# Patient Record
Sex: MALE | Race: WHITE | Employment: OTHER | URBAN - METROPOLITAN AREA
[De-identification: names, ages, dates, MRNs, and addresses within clinical notes are randomized per-mention and may not be internally consistent; named-entity substitution may affect disease eponyms.]

---

## 2017-02-07 ENCOUNTER — HOSPITAL ENCOUNTER (OUTPATIENT)
Dept: MRI IMAGING | Facility: HOSPITAL | Age: 37
Discharge: HOME | End: 2017-02-07
Attending: INTERNAL MEDICINE
Payer: MEDICAID

## 2017-02-07 DIAGNOSIS — M51.26 LUMBAR DISC HERNIATION: ICD-10-CM

## 2017-03-01 ENCOUNTER — HOSPITAL ENCOUNTER (OUTPATIENT)
Dept: MRI IMAGING | Facility: HOSPITAL | Age: 37
Discharge: HOME | End: 2017-03-01
Attending: INTERNAL MEDICINE
Payer: MEDICAID

## 2017-03-01 PROCEDURE — 72148 MRI LUMBAR SPINE W/O DYE: CPT

## 2017-03-16 PROBLEM — H91.93 BILATERAL HEARING LOSS, UNSPECIFIED HEARING LOSS TYPE: Status: ACTIVE | Noted: 2017-03-16

## 2017-03-20 PROBLEM — S76.202A INJURY OF ADDUCTOR MUSCLE AND TENDON OF LEFT THIGH, INITIAL ENCOUNTER: Status: ACTIVE | Noted: 2017-03-20

## 2017-04-20 ENCOUNTER — HOSPITAL ENCOUNTER (OUTPATIENT)
Dept: MRI IMAGING | Facility: HOSPITAL | Age: 37
Discharge: HOME | End: 2017-04-20
Attending: ORTHOPAEDIC SURGERY
Payer: MEDICAID

## 2017-04-20 DIAGNOSIS — S76.211A STRAIN OF ADDUCTOR MAGNUS MUSCLE, RIGHT, INITIAL ENCOUNTER: ICD-10-CM

## 2017-04-20 PROCEDURE — 73718 MRI LOWER EXTREMITY W/O DYE: CPT

## 2017-05-08 PROBLEM — S76.201A: Status: ACTIVE | Noted: 2017-05-08

## 2017-05-14 ENCOUNTER — HOSPITAL ENCOUNTER (OUTPATIENT)
Dept: MRI IMAGING | Facility: HOSPITAL | Age: 37
Discharge: HOME | End: 2017-05-14
Attending: ORTHOPAEDIC SURGERY
Payer: MEDICAID

## 2017-05-14 DIAGNOSIS — R10.31 GROIN PAIN, RIGHT: ICD-10-CM

## 2017-05-14 PROCEDURE — 73721 MRI JNT OF LWR EXTRE W/O DYE: CPT | Performed by: ORTHOPAEDIC SURGERY

## 2017-05-28 ENCOUNTER — HOSPITAL ENCOUNTER (EMERGENCY)
Facility: HOSPITAL | Age: 37
Discharge: HOME | End: 2017-05-29
Attending: EMERGENCY MEDICINE
Payer: MEDICAID

## 2017-05-28 DIAGNOSIS — R11.2 NAUSEA AND VOMITING IN ADULT: Primary | ICD-10-CM

## 2017-05-28 DIAGNOSIS — E86.0 DEHYDRATION: ICD-10-CM

## 2017-05-28 DIAGNOSIS — F11.23 OPIATE WITHDRAWAL (HCC): ICD-10-CM

## 2017-05-28 PROCEDURE — 83690 ASSAY OF LIPASE: CPT | Performed by: EMERGENCY MEDICINE

## 2017-05-28 PROCEDURE — 96376 TX/PRO/DX INJ SAME DRUG ADON: CPT

## 2017-05-28 PROCEDURE — 96361 HYDRATE IV INFUSION ADD-ON: CPT

## 2017-05-28 PROCEDURE — 93010 ELECTROCARDIOGRAM REPORT: CPT

## 2017-05-28 PROCEDURE — 85025 COMPLETE CBC W/AUTO DIFF WBC: CPT | Performed by: EMERGENCY MEDICINE

## 2017-05-28 PROCEDURE — 80053 COMPREHEN METABOLIC PANEL: CPT | Performed by: EMERGENCY MEDICINE

## 2017-05-28 PROCEDURE — 96374 THER/PROPH/DIAG INJ IV PUSH: CPT

## 2017-05-28 PROCEDURE — 99284 EMERGENCY DEPT VISIT MOD MDM: CPT

## 2017-05-28 PROCEDURE — 80307 DRUG TEST PRSMV CHEM ANLYZR: CPT | Performed by: EMERGENCY MEDICINE

## 2017-05-28 PROCEDURE — 81001 URINALYSIS AUTO W/SCOPE: CPT | Performed by: EMERGENCY MEDICINE

## 2017-05-28 PROCEDURE — 93005 ELECTROCARDIOGRAM TRACING: CPT

## 2017-05-28 RX ORDER — LORAZEPAM 2 MG/ML
1 INJECTION INTRAMUSCULAR ONCE
Status: COMPLETED | OUTPATIENT
Start: 2017-05-28 | End: 2017-05-28

## 2017-05-28 RX ORDER — LORAZEPAM 2 MG/ML
INJECTION INTRAMUSCULAR
Status: DISCONTINUED
Start: 2017-05-28 | End: 2017-05-29

## 2017-05-29 VITALS
WEIGHT: 185 LBS | HEIGHT: 77 IN | HEART RATE: 73 BPM | RESPIRATION RATE: 16 BRPM | DIASTOLIC BLOOD PRESSURE: 76 MMHG | BODY MASS INDEX: 21.84 KG/M2 | TEMPERATURE: 98 F | OXYGEN SATURATION: 98 % | SYSTOLIC BLOOD PRESSURE: 138 MMHG

## 2017-05-29 RX ORDER — CLONIDINE HYDROCHLORIDE 0.1 MG/1
0.1 TABLET ORAL DAILY
Qty: 10 TABLET | Refills: 0 | Status: SHIPPED | OUTPATIENT
Start: 2017-05-29 | End: 2017-06-07

## 2017-05-29 RX ORDER — LORAZEPAM 1 MG/1
1 TABLET ORAL 2 TIMES DAILY PRN
Qty: 20 TABLET | Refills: 0 | Status: SHIPPED | OUTPATIENT
Start: 2017-05-29 | End: 2017-06-05

## 2017-05-29 RX ORDER — ONDANSETRON 4 MG/1
4 TABLET, ORALLY DISINTEGRATING ORAL EVERY 4 HOURS PRN
Qty: 10 TABLET | Refills: 0 | Status: SHIPPED | OUTPATIENT
Start: 2017-05-29 | End: 2017-06-05
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
## 2017-05-29 NOTE — ED PROVIDER NOTES
Patient Seen in: [facility] Emergency Department    History   Patient presents with:  Eval-P (psychiatric)    Stated Complaint: eval p    HPI    68-year-old male with a history of chronic back pain, history of a right quadriceps muscle injury, asthm daily as needed for Anxiety. CloNIDine HCl 0.1 MG Oral Tab,  Take 1 tablet (0.1 mg total) by mouth daily. ondansetron 4 MG Oral Tablet Dispersible,  Take 1 tablet (4 mg total) by mouth every 4 (four) hours as needed for Nausea.    TraMADol HCl 50 MG Ora clear without evidence of injection or perforation. Neck exam: Supple. There is no lymphadenopathy or carotid bruit. Cardiovascular exam: Regular rate and rhythm. There are no murmurs, rubs, gallops. Lungs: Clear to auscultation bilaterally.   There is ------                     CBC W/ DIFFERENTIAL[[id number]]          Abnormal            Final result                 Please view results for these tests on the individual orders.    RAINBOW DRAW BLUE   RAINBOW DRAW GOLD   RAINBOW DRAW LAVENDER   RAINBOW DRAW Impression:  Nausea and vomiting in adult  (primary encounter diagnosis)  Dehydration  Opiate withdrawal [facility])    Disposition:  Discharge    Follow-up:  [facility]  [address] [facility] [place] [address]  [phone]  Schedule an appoint

## 2017-05-31 PROBLEM — S73.191A LABRAL TEAR OF RIGHT HIP JOINT: Status: ACTIVE | Noted: 2017-05-31

## 2017-05-31 PROBLEM — M16.11 PRIMARY OSTEOARTHRITIS OF RIGHT HIP: Status: ACTIVE | Noted: 2017-05-31

## 2017-05-31 PROBLEM — S76.201S: Status: ACTIVE | Noted: 2017-05-31

## 2017-06-07 PROBLEM — R03.0 ELEVATED BLOOD PRESSURE, SITUATIONAL: Status: ACTIVE | Noted: 2017-06-07

## 2017-07-10 ENCOUNTER — OFFICE VISIT (OUTPATIENT)
Dept: SLEEP CENTER | Facility: HOSPITAL | Age: 37
End: 2017-07-10
Attending: INTERNAL MEDICINE
Payer: MEDICAID

## 2017-07-10 PROCEDURE — 95810 POLYSOM 6/> YRS 4/> PARAM: CPT

## 2017-07-12 NOTE — PROCEDURES
[address] [address]       Accredited by the [facility] ([facility])    PATIENT'S NAME:        Halima Jose  ATTENDING PHYSICIAN:   Crystal Harkins M.D.   REFERRING PHYSICIAN:   Crystal Harkins M.D. yielding an overall apnea-hypopnea index of 2.1. These occurred exclusively in the supine position. In the lateral position, the patient had no apneas or snoring. The patient did not have any significant oxyhemoglobin desaturations.     PERIODIC LIMB MOV 12:17:33  [facility] [id number]  JF/    cc: Blanch Bernheim, MD

## 2017-07-18 ENCOUNTER — APPOINTMENT (OUTPATIENT)
Dept: CT IMAGING | Facility: HOSPITAL | Age: 37
End: 2017-07-18
Attending: EMERGENCY MEDICINE
Payer: MEDICAID

## 2017-07-18 ENCOUNTER — APPOINTMENT (OUTPATIENT)
Dept: GENERAL RADIOLOGY | Facility: HOSPITAL | Age: 37
End: 2017-07-18
Attending: EMERGENCY MEDICINE
Payer: MEDICAID

## 2017-07-18 PROCEDURE — 74177 CT ABD & PELVIS W/CONTRAST: CPT | Performed by: EMERGENCY MEDICINE

## 2017-07-18 PROCEDURE — 72125 CT NECK SPINE W/O DYE: CPT | Performed by: EMERGENCY MEDICINE

## 2017-07-18 PROCEDURE — 71020 XR CHEST PA + LAT CHEST (CPT=71020): CPT | Performed by: EMERGENCY MEDICINE

## 2017-07-18 PROCEDURE — 73000 X-RAY EXAM OF COLLAR BONE: CPT | Performed by: EMERGENCY MEDICINE

## 2017-07-18 PROCEDURE — 70450 CT HEAD/BRAIN W/O DYE: CPT | Performed by: EMERGENCY MEDICINE
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
## 2017-08-19 ENCOUNTER — APPOINTMENT (OUTPATIENT)
Dept: CT IMAGING | Facility: HOSPITAL | Age: 37
End: 2017-08-19
Attending: EMERGENCY MEDICINE
Payer: MEDICAID

## 2017-08-19 ENCOUNTER — APPOINTMENT (OUTPATIENT)
Dept: GENERAL RADIOLOGY | Facility: HOSPITAL | Age: 37
End: 2017-08-19
Attending: EMERGENCY MEDICINE
Payer: MEDICAID

## 2017-08-19 PROCEDURE — 71010 XR CHEST AP PORTABLE  (CPT=71010): CPT | Performed by: EMERGENCY MEDICINE

## 2017-08-19 PROCEDURE — 72125 CT NECK SPINE W/O DYE: CPT | Performed by: EMERGENCY MEDICINE

## 2017-08-19 PROCEDURE — 70450 CT HEAD/BRAIN W/O DYE: CPT | Performed by: EMERGENCY MEDICINE

## 2017-08-20 NOTE — ED NOTES
Pt request meds to assist w/ detox, ER Phys made aware of same and provided Pt w/ 2 medications to help Pt w/ detoxing from alcohol
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
## 2017-08-20 NOTE — ED NOTES
ER PCT safely assisted PT w/ ambulating around \"C\" pod. This was done w/o difficulty or incident.  PCT rpt that Pt walked \"steady\"

## 2017-08-20 NOTE — ED NOTES
ER Phys discussed w/ Pt repercussions of his drinking. Advised Pt that Pt needed to be able to walk safely and independently.

## 2017-08-20 NOTE — ED PROVIDER NOTES
Patient Seen in: [facility] Emergency Department    History   Patient presents with:  Alcohol Intoxication (neurologic)  Eval-P (psychiatric)    Stated Complaint: eval p - alcohol intoxication    HPI    Raymundo Patel is a 39-year-old male with a history of a (two) times daily.    MULTIVITAMINS OR TABS,  1 TABLET DAILY       Family History   Problem Relation Age of Onset   • Breast Cancer Mother    • Heart Disease Father      pacemaker   • Heart Disease Sister      pacemaker    • Heart Disease Paternal A Neutrophil Absolute Prelim 7.25 (*)     Neutrophil Absolute 7.25 (*)     Basophil Absolute 0.11 (*)     All other components within normal limits   BASIC METABOLIC PANEL (8) - Normal   CBC WITH DIFFERENTIAL WITH PLATELET    Narrative:      The following ord diagnosis)    Disposition:  Discharge    Follow-up:  Brigida Huston MD  [address] [phone]    In 3 days        Medications Prescribed:  Current Discharge Medication List

## 2017-09-13 PROBLEM — M51.369 DDD (DEGENERATIVE DISC DISEASE), LUMBAR: Status: ACTIVE | Noted: 2017-09-13

## 2017-09-13 PROBLEM — M51.36 DDD (DEGENERATIVE DISC DISEASE), LUMBAR: Status: ACTIVE | Noted: 2017-09-13

## 2017-09-14 PROBLEM — S42.032D CLOSED DISPLACED FRACTURE OF ACROMIAL END OF LEFT CLAVICLE WITH ROUTINE HEALING, SUBSEQUENT ENCOUNTER: Status: ACTIVE | Noted: 2017-09-14

## 2017-10-17 PROBLEM — M47.816 LUMBAR FACET ARTHROPATHY: Status: ACTIVE | Noted: 2017-10-17

## 2018-05-08 NOTE — ED NOTES
Wife came out to desk and told this RN patient has hx of drug and ETOH abuse, she states he is not honest when he has been using in the past
Writer walked by pt room pt was standing beside cart with c collar on. Pt has unsteady gait. Pt instructed that he needs to stay on the cart until his c spine is cleared and the collar is removed.  Pt and pt visitor VU of information given
normal...

## 2018-12-26 PROBLEM — F11.90 OPIATE USE: Status: ACTIVE | Noted: 2018-12-26

## 2019-02-22 ENCOUNTER — APPOINTMENT (OUTPATIENT)
Dept: GENERAL RADIOLOGY | Facility: HOSPITAL | Age: 39
End: 2019-02-22
Attending: EMERGENCY MEDICINE
Payer: MEDICAID

## 2019-02-22 PROCEDURE — 71045 X-RAY EXAM CHEST 1 VIEW: CPT | Performed by: EMERGENCY MEDICINE

## 2019-02-22 NOTE — ED PROVIDER NOTES
Patient Seen in: [facility] Emergency Department    History   Patient presents with:  Eval-P (psychiatric)    Stated Complaint: chest pain    HPI    65-year-old male presents emergency department claim left lower abdominal pain along with some midst °C) (Temporal)   Resp 11   Ht 195.6 cm (6' 5\")   Wt 88.5 kg   SpO2 94%   BMI 23.12 kg/m²         Physical Exam    Vital signs reviewed  General appearance: Patient is intoxicated with tachycardia  HEENT: Pupils equal react to light extraocular muscles int GOLD   CBC W/ DIFFERENTIAL     EKG    Rate, intervals and axes as noted on EKG Report.   Rate: 174  Rhythm: Atrial Fibrillation  Reading: 2year-old flutter with 2-1 AV block              She was evaluated and definitely appears very dehydrated has been dri

## 2019-02-22 NOTE — ED NOTES
Patient's wife Anisha Armas called and asking for update. She states she is his medical POA. Patient gave his permission for RN to speak with her.

## 2019-02-22 NOTE — BH LEVEL OF CARE ASSESSMENT
Level of Care Assessment Note    General Questions  Why are you here?: Pt states he has been on an 8 day drinking binge  approximately a bottle/Pint a day.   \"i was afraid i might have a heart attack\"   Precipitating Events: Pt. states he came to ER due t access to means to attempt suicide or harm others or property: No  Access to Firearm/Weapon: Yes(Pt. has access to guns and bow/arrow)  Current Location of Firearm/Weapon: all weapons at mothers home and secured  [employer]: secured   Do you ha states pattern can be irregular but the past week it has been every day)  Alcohol Use  Age at first use?: 17  Route: Oral  Average amount used? : Pt. states 750 Mg when he drinks  How long with this pattern of use?: Pt. states 3 years at an irregular patte sure he was ok. He was totally cooperative and responsive to the assessment process. He scored 0 on the CSSR. He epressed no SI or HI. He was alert and coherent. No psychosis observed or presented.   He had an 8 day drinking binge that led to his curre

## 2019-04-18 PROBLEM — G89.29 CHRONIC MIDLINE LOW BACK PAIN WITHOUT SCIATICA: Status: ACTIVE | Noted: 2019-04-18

## 2019-04-18 PROBLEM — M54.50 CHRONIC MIDLINE LOW BACK PAIN WITHOUT SCIATICA: Status: ACTIVE | Noted: 2019-04-18

## 2019-07-18 PROBLEM — Z13.0 SCREENING FOR DISORDER OF BLOOD AND BLOOD-FORMING ORGANS: Status: ACTIVE | Noted: 2019-07-18

## 2019-07-18 PROBLEM — J45.990 EXERCISE-INDUCED ASTHMA (HCC): Status: ACTIVE | Noted: 2019-07-18

## 2019-07-18 PROBLEM — L73.9 FOLLICULITIS: Status: ACTIVE | Noted: 2019-07-18

## 2019-07-18 PROBLEM — Z13.29 SCREENING FOR THYROID DISORDER: Status: ACTIVE | Noted: 2019-07-18

## 2019-07-18 PROBLEM — J45.990 EXERCISE-INDUCED ASTHMA: Status: ACTIVE | Noted: 2019-07-18

## 2019-07-18 PROBLEM — K21.9 GASTROESOPHAGEAL REFLUX DISEASE WITHOUT ESOPHAGITIS: Status: ACTIVE | Noted: 2019-07-18

## 2019-07-18 PROBLEM — Z13.29 ENCOUNTER FOR SCREENING FOR ENDOCRINE DISORDER: Status: ACTIVE | Noted: 2019-07-18

## 2019-07-18 PROBLEM — Z13.228 ENCOUNTER FOR SCREENING FOR METABOLIC DISORDER: Status: ACTIVE | Noted: 2019-07-18

## 2019-07-18 PROBLEM — Z13.220 ENCOUNTER FOR SCREENING FOR LIPID DISORDER: Status: ACTIVE | Noted: 2019-07-18

## 2020-09-09 PROBLEM — R56.9 SEIZURE (HCC): Status: ACTIVE | Noted: 2017-07-12

## 2020-09-09 PROBLEM — F10.239 ALCOHOL WITHDRAWAL SYNDROME WITH COMPLICATION (HCC): Status: ACTIVE | Noted: 2017-07-12

## 2020-09-09 PROBLEM — IMO0001 ELEVATED BLOOD PRESSURE: Status: ACTIVE | Noted: 2017-07-12

## 2020-09-09 PROBLEM — F10.939 ALCOHOL WITHDRAWAL SYNDROME WITH COMPLICATION (HCC): Status: ACTIVE | Noted: 2017-07-12

## 2023-02-02 NOTE — ED NOTES
Patient belongings placed in Smart [employer] bag # [id number] only item is a dark navy blue with green colored pair of shorts. Smart Safe bag placed in bin behind nurses station. medcine Dr Montejo Dr. Montejo Hospitalist Dr. Holm

## 2024-09-29 ENCOUNTER — TELEPHONE (OUTPATIENT)
Dept: CASE MANAGEMENT | Facility: HOSPITAL | Age: 44
End: 2024-09-29

## 2024-09-29 ENCOUNTER — HOSPITAL ENCOUNTER (INPATIENT)
Facility: HOSPITAL | Age: 44
LOS: 3 days | Discharge: HOME | DRG: 682 | End: 2024-10-02
Attending: EMERGENCY MEDICINE | Admitting: HOSPITALIST
Payer: MEDICAID

## 2024-09-29 ENCOUNTER — HOSPITAL ENCOUNTER (INPATIENT)
Facility: HOSPITAL | Age: 44
End: 2024-09-29
Attending: EMERGENCY MEDICINE | Admitting: HOSPITALIST
Payer: MEDICAID

## 2024-09-29 DIAGNOSIS — M54.50 CHRONIC MIDLINE LOW BACK PAIN WITHOUT SCIATICA: ICD-10-CM

## 2024-09-29 DIAGNOSIS — E87.20 METABOLIC ACIDOSIS: ICD-10-CM

## 2024-09-29 DIAGNOSIS — N17.9 ACUTE RENAL FAILURE, UNSPECIFIED ACUTE RENAL FAILURE TYPE (HCC): ICD-10-CM

## 2024-09-29 DIAGNOSIS — E87.1 HYPONATREMIA: Primary | ICD-10-CM

## 2024-09-29 DIAGNOSIS — F10.930 ALCOHOL WITHDRAWAL SYNDROME WITHOUT COMPLICATION (HCC): ICD-10-CM

## 2024-09-29 DIAGNOSIS — G47.50 PARASOMNIA: ICD-10-CM

## 2024-09-29 DIAGNOSIS — R56.9 FIRST TIME SEIZURE (HCC): ICD-10-CM

## 2024-09-29 DIAGNOSIS — G89.29 CHRONIC MIDLINE LOW BACK PAIN WITHOUT SCIATICA: ICD-10-CM

## 2024-09-29 LAB
ALBUMIN SERPL-MCNC: 3.7 G/DL (ref 3.2–4.8)
ALBUMIN/GLOB SERPL: 1.7 {RATIO} (ref 1–2)
ALP LIVER SERPL-CCNC: 75 U/L
ALT SERPL-CCNC: 77 U/L
ANION GAP SERPL CALC-SCNC: 12 MMOL/L (ref 0–18)
ANION GAP SERPL CALC-SCNC: 15 MMOL/L (ref 0–18)
ANION GAP SERPL CALC-SCNC: 31 MMOL/L (ref 0–18)
ANION GAP SERPL CALC-SCNC: 7 MMOL/L (ref 0–18)
AST SERPL-CCNC: 94 U/L (ref ?–34)
BASE EXCESS BLDV CALC-SCNC: -9.5 MMOL/L
BASOPHILS # BLD AUTO: 0.03 X10(3) UL (ref 0–0.2)
BASOPHILS NFR BLD AUTO: 0.3 %
BILIRUB SERPL-MCNC: 2.1 MG/DL (ref 0.3–1.2)
BUN BLD-MCNC: 31 MG/DL (ref 9–23)
BUN BLD-MCNC: 36 MG/DL (ref 9–23)
BUN BLD-MCNC: 39 MG/DL (ref 9–23)
BUN BLD-MCNC: 40 MG/DL (ref 9–23)
C DIFF TOX B STL QL: NEGATIVE
CALCIUM BLD-MCNC: 7 MG/DL (ref 8.7–10.4)
CALCIUM BLD-MCNC: 7.2 MG/DL (ref 8.7–10.4)
CALCIUM BLD-MCNC: 7.2 MG/DL (ref 8.7–10.4)
CALCIUM BLD-MCNC: 7.8 MG/DL (ref 8.7–10.4)
CHLORIDE SERPL-SCNC: 74 MMOL/L (ref 98–112)
CHLORIDE SERPL-SCNC: 79 MMOL/L (ref 98–112)
CHLORIDE SERPL-SCNC: 82 MMOL/L (ref 98–112)
CHLORIDE SERPL-SCNC: 86 MMOL/L (ref 98–112)
CO2 SERPL-SCNC: 12 MMOL/L (ref 21–32)
CO2 SERPL-SCNC: 22 MMOL/L (ref 21–32)
CO2 SERPL-SCNC: 24 MMOL/L (ref 21–32)
CO2 SERPL-SCNC: 27 MMOL/L (ref 21–32)
CREAT BLD-MCNC: 2.09 MG/DL
CREAT BLD-MCNC: 2.69 MG/DL
CREAT BLD-MCNC: 3.13 MG/DL
CREAT BLD-MCNC: 3.29 MG/DL
EGFRCR SERPLBLD CKD-EPI 2021: 23 ML/MIN/1.73M2 (ref 60–?)
EGFRCR SERPLBLD CKD-EPI 2021: 24 ML/MIN/1.73M2 (ref 60–?)
EGFRCR SERPLBLD CKD-EPI 2021: 29 ML/MIN/1.73M2 (ref 60–?)
EGFRCR SERPLBLD CKD-EPI 2021: 39 ML/MIN/1.73M2 (ref 60–?)
EOSINOPHIL # BLD AUTO: 0.01 X10(3) UL (ref 0–0.7)
EOSINOPHIL NFR BLD AUTO: 0.1 %
ERYTHROCYTE [DISTWIDTH] IN BLOOD BY AUTOMATED COUNT: 12.4 %
ERYTHROCYTE [DISTWIDTH] IN BLOOD BY AUTOMATED COUNT: 12.6 %
EST. AVERAGE GLUCOSE BLD GHB EST-MCNC: 203 MG/DL (ref 68–126)
ETHANOL SERPL-MCNC: <3 MG/DL (ref ?–3)
GLOBULIN PLAS-MCNC: 2.2 G/DL (ref 2–3.5)
GLUCOSE BLD-MCNC: 269 MG/DL (ref 70–99)
GLUCOSE BLD-MCNC: 301 MG/DL (ref 70–99)
GLUCOSE BLD-MCNC: 323 MG/DL (ref 70–99)
GLUCOSE BLD-MCNC: 331 MG/DL (ref 70–99)
GLUCOSE BLD-MCNC: 349 MG/DL (ref 70–99)
GLUCOSE BLD-MCNC: 361 MG/DL (ref 70–99)
GLUCOSE BLD-MCNC: 363 MG/DL (ref 70–99)
HBA1C MFR BLD: 8.7 % (ref ?–5.7)
HCO3 BLDV-SCNC: 17.1 MEQ/L (ref 22–26)
HCT VFR BLD AUTO: 36.5 %
HCT VFR BLD AUTO: 39.4 %
HGB BLD-MCNC: 13.5 G/DL
HGB BLD-MCNC: 14.2 G/DL
IMM GRANULOCYTES # BLD AUTO: 0.04 X10(3) UL (ref 0–1)
IMM GRANULOCYTES NFR BLD: 0.4 %
LACTATE BLD-SCNC: 6.2 MMOL/L (ref 0.5–2)
LACTATE SERPL-SCNC: 1.6 MMOL/L (ref 0.5–2)
LACTATE SERPL-SCNC: 2.1 MMOL/L (ref 0.5–2)
LACTATE SERPL-SCNC: 3.9 MMOL/L (ref 0.5–2)
LIPASE SERPL-CCNC: 76 U/L (ref 12–53)
LYMPHOCYTES # BLD AUTO: 1.69 X10(3) UL (ref 1–4)
LYMPHOCYTES NFR BLD AUTO: 16.2 %
MAGNESIUM SERPL-MCNC: 1.8 MG/DL (ref 1.6–2.6)
MCH RBC QN AUTO: 28.7 PG (ref 26–34)
MCH RBC QN AUTO: 29 PG (ref 26–34)
MCHC RBC AUTO-ENTMCNC: 36 G/DL (ref 31–37)
MCHC RBC AUTO-ENTMCNC: 37 G/DL (ref 31–37)
MCV RBC AUTO: 78.5 FL
MCV RBC AUTO: 79.8 FL
MONOCYTES # BLD AUTO: 1.05 X10(3) UL (ref 0.1–1)
MONOCYTES NFR BLD AUTO: 10.1 %
MRSA DNA SPEC QL NAA+PROBE: NEGATIVE
NEUTROPHILS # BLD AUTO: 7.62 X10 (3) UL (ref 1.5–7.7)
NEUTROPHILS # BLD AUTO: 7.62 X10(3) UL (ref 1.5–7.7)
NEUTROPHILS NFR BLD AUTO: 72.9 %
OSMOLALITY SERPL CALC.SUM OF ELEC: 263 MOSM/KG (ref 275–295)
OSMOLALITY SERPL CALC.SUM OF ELEC: 266 MOSM/KG (ref 275–295)
OSMOLALITY SERPL CALC.SUM OF ELEC: 269 MOSM/KG (ref 275–295)
OSMOLALITY SERPL CALC.SUM OF ELEC: 271 MOSM/KG (ref 275–295)
OXYHGB MFR BLDV: 81.7 % (ref 72–78)
PCO2 BLDV: 23 MM HG (ref 38–50)
PH BLDV: 7.38 [PH] (ref 7.33–7.43)
PLATELET # BLD AUTO: 144 10(3)UL (ref 150–450)
PLATELET # BLD AUTO: 187 10(3)UL (ref 150–450)
PLATELETS.RETICULATED NFR BLD AUTO: 8.9 % (ref 0–7)
PO2 BLDV: 51 MM HG (ref 30–50)
POTASSIUM SERPL-SCNC: 3.5 MMOL/L (ref 3.5–5.1)
POTASSIUM SERPL-SCNC: 4 MMOL/L (ref 3.5–5.1)
POTASSIUM SERPL-SCNC: 4 MMOL/L (ref 3.5–5.1)
POTASSIUM SERPL-SCNC: 4.4 MMOL/L (ref 3.5–5.1)
PROT SERPL-MCNC: 5.9 G/DL (ref 5.7–8.2)
RBC # BLD AUTO: 4.65 X10(6)UL
RBC # BLD AUTO: 4.94 X10(6)UL
SARS-COV-2 RNA RESP QL NAA+PROBE: NOT DETECTED
SODIUM SERPL-SCNC: 116 MMOL/L (ref 136–145)
SODIUM SERPL-SCNC: 117 MMOL/L (ref 136–145)
SODIUM SERPL-SCNC: 118 MMOL/L (ref 136–145)
SODIUM SERPL-SCNC: 120 MMOL/L (ref 136–145)
WBC # BLD AUTO: 10.4 X10(3) UL (ref 4–11)
WBC # BLD AUTO: 7.2 X10(3) UL (ref 4–11)

## 2024-09-29 PROCEDURE — 99223 1ST HOSP IP/OBS HIGH 75: CPT | Performed by: HOSPITALIST

## 2024-09-29 PROCEDURE — 99291 CRITICAL CARE FIRST HOUR: CPT | Performed by: INTERNAL MEDICINE

## 2024-09-29 PROCEDURE — HZ2ZZZZ DETOXIFICATION SERVICES FOR SUBSTANCE ABUSE TREATMENT: ICD-10-PCS | Performed by: INTERNAL MEDICINE

## 2024-09-29 RX ORDER — LORAZEPAM 2 MG/ML
0.5 INJECTION INTRAMUSCULAR ONCE
Status: COMPLETED | OUTPATIENT
Start: 2024-09-29 | End: 2024-09-29

## 2024-09-29 RX ORDER — SODIUM CHLORIDE 9 MG/ML
1000 INJECTION, SOLUTION INTRAVENOUS ONCE
Status: COMPLETED | OUTPATIENT
Start: 2024-09-29 | End: 2024-09-29

## 2024-09-29 RX ORDER — LORAZEPAM 2 MG/ML
5 INJECTION INTRAMUSCULAR
Status: DISCONTINUED | OUTPATIENT
Start: 2024-09-29 | End: 2024-09-30

## 2024-09-29 RX ORDER — LORAZEPAM 2 MG/ML
2 INJECTION INTRAMUSCULAR EVERY 2 HOUR PRN
Status: DISCONTINUED | OUTPATIENT
Start: 2024-09-29 | End: 2024-09-30

## 2024-09-29 RX ORDER — LORAZEPAM 1 MG/1
1 TABLET ORAL EVERY 4 HOURS PRN
Status: DISCONTINUED | OUTPATIENT
Start: 2024-09-29 | End: 2024-09-30

## 2024-09-29 RX ORDER — ONDANSETRON 2 MG/ML
4 INJECTION INTRAMUSCULAR; INTRAVENOUS EVERY 6 HOURS PRN
Status: DISCONTINUED | OUTPATIENT
Start: 2024-09-29 | End: 2024-09-29

## 2024-09-29 RX ORDER — FLUTICASONE PROPIONATE AND SALMETEROL 250; 50 UG/1; UG/1
1 POWDER RESPIRATORY (INHALATION) 2 TIMES DAILY
Status: DISCONTINUED | OUTPATIENT
Start: 2024-09-29 | End: 2024-09-29

## 2024-09-29 RX ORDER — PROCHLORPERAZINE EDISYLATE 5 MG/ML
5 INJECTION INTRAMUSCULAR; INTRAVENOUS EVERY 8 HOURS PRN
Status: DISCONTINUED | OUTPATIENT
Start: 2024-09-29 | End: 2024-10-02

## 2024-09-29 RX ORDER — NICOTINE POLACRILEX 4 MG
30 LOZENGE BUCCAL
Status: DISCONTINUED | OUTPATIENT
Start: 2024-09-29 | End: 2024-10-02

## 2024-09-29 RX ORDER — CHLORDIAZEPOXIDE HYDROCHLORIDE 25 MG/1
25 CAPSULE, GELATIN COATED ORAL EVERY 12 HOURS
Status: DISCONTINUED | OUTPATIENT
Start: 2024-10-01 | End: 2024-09-30

## 2024-09-29 RX ORDER — LORAZEPAM 2 MG/ML
3 INJECTION INTRAMUSCULAR
Status: DISCONTINUED | OUTPATIENT
Start: 2024-09-29 | End: 2024-09-30

## 2024-09-29 RX ORDER — ONDANSETRON 2 MG/ML
4 INJECTION INTRAMUSCULAR; INTRAVENOUS EVERY 6 HOURS PRN
Status: DISCONTINUED | OUTPATIENT
Start: 2024-09-29 | End: 2024-10-02

## 2024-09-29 RX ORDER — LORAZEPAM 2 MG/ML
6 INJECTION INTRAMUSCULAR EVERY 10 MIN PRN
Status: DISCONTINUED | OUTPATIENT
Start: 2024-09-29 | End: 2024-09-30

## 2024-09-29 RX ORDER — CHLORDIAZEPOXIDE HYDROCHLORIDE 25 MG/1
25 CAPSULE, GELATIN COATED ORAL EVERY 6 HOURS
Status: COMPLETED | OUTPATIENT
Start: 2024-09-29 | End: 2024-09-30

## 2024-09-29 RX ORDER — THIAMINE HYDROCHLORIDE 100 MG/ML
100 INJECTION, SOLUTION INTRAMUSCULAR; INTRAVENOUS ONCE
Status: COMPLETED | OUTPATIENT
Start: 2024-09-29 | End: 2024-09-29

## 2024-09-29 RX ORDER — SODIUM PHOSPHATE, DIBASIC AND SODIUM PHOSPHATE, MONOBASIC 7; 19 G/230ML; G/230ML
1 ENEMA RECTAL ONCE AS NEEDED
Status: DISCONTINUED | OUTPATIENT
Start: 2024-09-29 | End: 2024-10-02

## 2024-09-29 RX ORDER — LORAZEPAM 2 MG/1
2 TABLET ORAL EVERY 2 HOUR PRN
Status: DISCONTINUED | OUTPATIENT
Start: 2024-09-29 | End: 2024-09-30

## 2024-09-29 RX ORDER — CHLORDIAZEPOXIDE HYDROCHLORIDE 25 MG/1
25 CAPSULE, GELATIN COATED ORAL EVERY 8 HOURS
Status: DISCONTINUED | OUTPATIENT
Start: 2024-09-30 | End: 2024-09-30

## 2024-09-29 RX ORDER — MELATONIN
100 DAILY
Status: DISCONTINUED | OUTPATIENT
Start: 2024-09-30 | End: 2024-10-02

## 2024-09-29 RX ORDER — INSULIN DEGLUDEC 100 U/ML
16 INJECTION, SOLUTION SUBCUTANEOUS DAILY
Status: DISCONTINUED | OUTPATIENT
Start: 2024-09-29 | End: 2024-09-30

## 2024-09-29 RX ORDER — SENNOSIDES 8.6 MG
17.2 TABLET ORAL NIGHTLY PRN
Status: DISCONTINUED | OUTPATIENT
Start: 2024-09-29 | End: 2024-10-02

## 2024-09-29 RX ORDER — BISACODYL 10 MG
10 SUPPOSITORY, RECTAL RECTAL
Status: DISCONTINUED | OUTPATIENT
Start: 2024-09-29 | End: 2024-10-02

## 2024-09-29 RX ORDER — GABAPENTIN 600 MG/1
600 TABLET ORAL NIGHTLY
Status: DISCONTINUED | OUTPATIENT
Start: 2024-09-29 | End: 2024-10-02

## 2024-09-29 RX ORDER — FOLIC ACID 1 MG/1
1 TABLET ORAL DAILY
Status: DISCONTINUED | OUTPATIENT
Start: 2024-09-29 | End: 2024-10-02

## 2024-09-29 RX ORDER — SODIUM CHLORIDE 9 MG/ML
INJECTION, SOLUTION INTRAVENOUS CONTINUOUS
Status: DISCONTINUED | OUTPATIENT
Start: 2024-09-29 | End: 2024-09-30

## 2024-09-29 RX ORDER — ALBUTEROL SULFATE 90 UG/1
2 INHALANT RESPIRATORY (INHALATION) EVERY 6 HOURS PRN
Status: DISCONTINUED | OUTPATIENT
Start: 2024-09-29 | End: 2024-10-02

## 2024-09-29 RX ORDER — GABAPENTIN 300 MG/1
300 CAPSULE ORAL EVERY MORNING
Status: DISCONTINUED | OUTPATIENT
Start: 2024-09-29 | End: 2024-10-02

## 2024-09-29 RX ORDER — ONDANSETRON 2 MG/ML
INJECTION INTRAMUSCULAR; INTRAVENOUS
Status: COMPLETED
Start: 2024-09-29 | End: 2024-09-29

## 2024-09-29 RX ORDER — DEXTROSE MONOHYDRATE 25 G/50ML
50 INJECTION, SOLUTION INTRAVENOUS
Status: DISCONTINUED | OUTPATIENT
Start: 2024-09-29 | End: 2024-10-02

## 2024-09-29 RX ORDER — PROCHLORPERAZINE EDISYLATE 5 MG/ML
5 INJECTION INTRAMUSCULAR; INTRAVENOUS EVERY 8 HOURS PRN
Status: DISCONTINUED | OUTPATIENT
Start: 2024-09-29 | End: 2024-09-29

## 2024-09-29 RX ORDER — LORAZEPAM 2 MG/ML
1 INJECTION INTRAMUSCULAR EVERY 4 HOURS PRN
Status: DISCONTINUED | OUTPATIENT
Start: 2024-09-29 | End: 2024-09-30

## 2024-09-29 RX ORDER — MULTIPLE VITAMINS W/ MINERALS TAB 9MG-400MCG
1 TAB ORAL DAILY
Status: DISCONTINUED | OUTPATIENT
Start: 2024-09-29 | End: 2024-10-02

## 2024-09-29 RX ORDER — LORAZEPAM 2 MG/ML
4 INJECTION INTRAMUSCULAR EVERY 30 MIN PRN
Status: DISCONTINUED | OUTPATIENT
Start: 2024-09-29 | End: 2024-09-30

## 2024-09-29 RX ORDER — MAGNESIUM SULFATE HEPTAHYDRATE 40 MG/ML
2 INJECTION, SOLUTION INTRAVENOUS ONCE
Status: COMPLETED | OUTPATIENT
Start: 2024-09-29 | End: 2024-09-29

## 2024-09-29 RX ORDER — POLYETHYLENE GLYCOL 3350 17 G/17G
17 POWDER, FOR SOLUTION ORAL DAILY PRN
Status: DISCONTINUED | OUTPATIENT
Start: 2024-09-29 | End: 2024-10-02

## 2024-09-29 RX ORDER — HEPARIN SODIUM 5000 [USP'U]/ML
5000 INJECTION, SOLUTION INTRAVENOUS; SUBCUTANEOUS EVERY 8 HOURS SCHEDULED
Status: DISCONTINUED | OUTPATIENT
Start: 2024-09-29 | End: 2024-09-30

## 2024-09-29 RX ORDER — SODIUM CHLORIDE 9 MG/ML
INJECTION, SOLUTION INTRAVENOUS ONCE
Status: COMPLETED | OUTPATIENT
Start: 2024-09-29 | End: 2024-09-29

## 2024-09-29 RX ORDER — CHLORDIAZEPOXIDE HYDROCHLORIDE 25 MG/1
25 CAPSULE, GELATIN COATED ORAL EVERY 24 HOURS
Status: DISCONTINUED | OUTPATIENT
Start: 2024-10-02 | End: 2024-09-30

## 2024-09-29 RX ORDER — SODIUM CHLORIDE 9 MG/ML
INJECTION, SOLUTION INTRAVENOUS CONTINUOUS
Status: ACTIVE | OUTPATIENT
Start: 2024-09-29 | End: 2024-09-29

## 2024-09-29 RX ORDER — NICOTINE POLACRILEX 4 MG
15 LOZENGE BUCCAL
Status: DISCONTINUED | OUTPATIENT
Start: 2024-09-29 | End: 2024-10-02

## 2024-09-29 RX ORDER — MAGNESIUM HYDROXIDE/ALUMINUM HYDROXICE/SIMETHICONE 120; 1200; 1200 MG/30ML; MG/30ML; MG/30ML
30 SUSPENSION ORAL 4 TIMES DAILY PRN
Status: DISCONTINUED | OUTPATIENT
Start: 2024-09-29 | End: 2024-10-02

## 2024-09-29 RX ORDER — CHLORPROMAZINE HYDROCHLORIDE 25 MG/ML
25 INJECTION INTRAMUSCULAR ONCE
Status: COMPLETED | OUTPATIENT
Start: 2024-09-29 | End: 2024-09-29

## 2024-09-29 NOTE — ED QUICK NOTES
Patient was notified the [employer] called and requested he called the 800 number that he has when able to.

## 2024-09-29 NOTE — CONSULTS
[facility]  Report of Consultation    Rambo Freitas Patient Status:  Inpatient    3/19/1980 MRN [id number]   Location [facility] 4SW-A Attending Jo Ann Oliva MD   Hosp Day # 0 PCP None Pcp     Reason for Consultation:  Hyponatremia/NAKUL/acidosis    History of Present Illness:  Rambo Freitas is a a(n) 44 year old man with hx DM,  etOH use/abuse who presented to ED today with c/o severe N/V for the past 24-48 hrs. He had been drinking heavily for several days but stopped yest.  In ED noted to have hyponatremia and NAKUL with  meq/l and creat 3.3 mg/dl.      History:  Past Medical History:    Asthma (HCC)    Back pain    2 bulging disks    Bilateral hearing loss, unspecified hearing loss type    Exercise-induced asthma (HCC)    Mitral valve prolapse    has been checked for heart problems and arrythmia at [facility] (about )    Rib fracture    left side    Sleep walking    Type 1 diabetes mellitus (HCC)     Past Surgical History:   Procedure Laterality Date    Tonsillectomy  age 17     Family History   Problem Relation Age of Onset    Breast Cancer Mother     Heart Disease Father         pacemaker    Heart Disease Sister         pacemaker     Heart Disease Paternal Aunt         pacemaker    Heart Disease Paternal Aunt         pacemaker      reports that he has quit smoking. His smoking use included cigarettes. He has a 10 pack-year smoking history. He has never used smokeless tobacco. He reports that he does not currently use alcohol after a past usage of about 3.0 standard drinks of alcohol per week. He reports that he does not use drugs.    Allergies:  No Known Allergies    Medications:    Current Facility-Administered Medications:     [Held by provider] heparin (Porcine) 5000 UNIT/ML injection 5,000 Units, 5,000 Units, Subcutaneous, Q8H MEGAN    ondansetron (Zofran) 4 MG/2ML injection 4 mg, 4 mg, Intravenous, Q6H PRN    prochlorperazine (Compazine) 10 MG/2ML injection 5 mg, 5 mg,  Intravenous, Q8H PRN    alum-mag hydroxide-simethicone (Maalox) 200-200-20 MG/5ML oral suspension 30 mL, 30 mL, Oral, QID PRN    pantoprazole (Protonix) 40 mg in sodium chloride 0.9% PF 10 mL IV push, 40 mg, Intravenous, Q12H    albuterol (Ventolin HFA) 108 (90 Base) MCG/ACT inhaler 2 puff, 2 puff, Inhalation, Q6H PRN    glucose (Dex4) 15 GM/59ML oral liquid 15 g, 15 g, Oral, Q15 Min PRN **OR** glucose (Glutose) 40% oral gel 15 g, 15 g, Oral, Q15 Min PRN **OR** glucose-vitamin C (Dex-4) chewable tab 4 tablet, 4 tablet, Oral, Q15 Min PRN **OR** dextrose 50% injection 50 mL, 50 mL, Intravenous, Q15 Min PRN **OR** glucose (Dex4) 15 GM/59ML oral liquid 30 g, 30 g, Oral, Q15 Min PRN **OR** glucose (Glutose) 40% oral gel 30 g, 30 g, Oral, Q15 Min PRN **OR** glucose-vitamin C (Dex-4) chewable tab 8 tablet, 8 tablet, Oral, Q15 Min PRN    sodium chloride 0.9% infusion, , Intravenous, Continuous    polyethylene glycol (PEG 3350) (Miralax) 17 g oral packet 17 g, 17 g, Oral, Daily PRN    sennosides (Senokot) tab 17.2 mg, 17.2 mg, Oral, Nightly PRN    bisacodyl (Dulcolax) 10 MG rectal suppository 10 mg, 10 mg, Rectal, Daily PRN    fleet enema (Fleet) rectal enema 133 mL, 1 enema, Rectal, Once PRN    insulin degludec (Tresiba) 100 units/mL flextouch 16 Units, 16 Units, Subcutaneous, Daily    insulin aspart (NovoLOG) 100 Units/mL FlexPen 1-10 Units, 1-10 Units, Subcutaneous, TID AC and HS    gabapentin (Neurontin) cap 300 mg, 300 mg, Oral, QAM    gabapentin (Neurontin) tab 600 mg, 600 mg, Oral, Nightly    LORazepam (Ativan) tab 1 mg, 1 mg, Oral, Q4H PRN **OR** LORazepam (Ativan) 2 mg/mL injection 1 mg, 1 mg, Intravenous, Q4H PRN    LORazepam (Ativan) tab 2 mg, 2 mg, Oral, Q2H PRN **OR** LORazepam (Ativan) 2 mg/mL injection 2 mg, 2 mg, Intravenous, Q2H PRN    LORazepam (Ativan) tab 3 mg, 3 mg, Oral, Q1H PRN **OR** LORazepam (Ativan) 2 mg/mL injection 3 mg, 3 mg, Intravenous, Q1H PRN    LORazepam (Ativan) 2 mg/mL injection 4 mg, 4  mg, Intravenous, Q30 Min PRN    LORazepam (Ativan) 2 mg/mL injection 5 mg, 5 mg, Intravenous, Q15 Min PRN    LORazepam (Ativan) 2 mg/mL injection 6 mg, 6 mg, Intravenous, Q10 Min PRN    chlordiazePOXIDE (Librium) cap 25 mg, 25 mg, Oral, Q6H **FOLLOWED BY** [START ON 2024] chlordiazePOXIDE (Librium) cap 25 mg, 25 mg, Oral, Q8H **FOLLOWED BY** [START ON 10/1/2024] chlordiazePOXIDE (Librium) cap 25 mg, 25 mg, Oral, Q12H **FOLLOWED BY** [START ON 10/2/2024] chlordiazePOXIDE (Librium) cap 25 mg, 25 mg, Oral, Q24H    [START ON 2024] thiamine (Vitamin B1) tab 100 mg, 100 mg, Oral, Daily    multivitamin with minerals (Thera M Plus) tab 1 tablet, 1 tablet, Oral, Daily    folic acid (Folvite) tab 1 mg, 1 mg, Oral, Daily  No current outpatient medications on file.       Review of Systems:  Denies fever/chills  Denies wt loss/gain  Denies HA or visual changes  Denies CP or palpitations  Denies SOB/cough/hemoptysis  Denies abd or flank pain  + N/V  Denies change in urinary habits or gross hematuria  Denies LE edema  Denies skin rashes/myalgias/arthralgias      Physical Exam:   /54   Pulse 96   Temp 98.1 °F (36.7 °C) (Temporal)   Resp 19   Wt 235 lb 7.2 oz (106.8 kg)   SpO2 93%   BMI 27.92 kg/m²   Temp (24hrs), Av °F (36.7 °C), Min:97.9 °F (36.6 °C), Max:98.1 °F (36.7 °C)       Intake/Output Summary (Last 24 hours) at 2024 1323  Last data filed at 2024 1300  Gross per 24 hour   Intake 2137.55 ml   Output 200 ml   Net 1937.55 ml     Last 3 Weights   24 1000 235 lb 7.2 oz (106.8 kg)   24 0918 235 lb 7.2 oz (106.8 kg)   24 1143 230 lb (104.3 kg)   20 1627 202 lb 6.4 oz (91.8 kg)   20 1129 160 lb (72.6 kg)   20 1452 205 lb (93 kg)     General: Alert but a bit confused in no apparent distress.  HEENT: No scleral icterus, dry MM  Neck: Supple, no DELFINO or thyromegaly  Cardiac: Regular rate and rhythm, S1, S2 normal, no murmur or rub  Lungs: Clear without wheezes,  rales, rhonchi.    Abdomen: Soft, mildly diffusely tender. + bowel sounds,   Extremities: Without clubbing, cyanosis or edema.  Neurologic: moving all extremities  Skin: Warm and dry, no rashes      Laboratory Data:  Lab Results   Component Value Date    WBC 10.4 09/29/2024    HGB 14.2 09/29/2024    HCT 39.4 09/29/2024    .0 09/29/2024    CREATSERUM 3.13 09/29/2024    BUN 40 09/29/2024     09/29/2024    K 4.0 09/29/2024    CL 79 09/29/2024    CO2 22.0 09/29/2024     09/29/2024    CA 7.2 09/29/2024    ALB 3.7 09/29/2024    ALKPHO 75 09/29/2024    BILT 2.1 09/29/2024    TP 5.9 09/29/2024    AST 94 09/29/2024    ALT 77 09/29/2024    LIP 76 09/29/2024    MG 1.8 09/29/2024    ETOH <3 09/29/2024    PGLU 349 09/29/2024       BUN (mg/dL)   Date Value   09/29/2024 40 (H)   09/29/2024 31 (H)   09/25/2024 8 (L)     Blood Urea Nitrogen (mg/dL)   Date Value   12/10/2019 13.0   12/02/2019 15.0     Creatinine (mg/dL)   Date Value   09/29/2024 3.13 (H)   09/29/2024 3.29 (H)   09/25/2024 0.87   12/10/2019 0.90   12/02/2019 0.96       No results found for: \"MICROALBCREA\"    Recent Labs   Lab 09/25/24  1239 09/29/24  0547   WBC 6.2 10.4   HGB 15.7 14.2   MCV 81.1 79.8*   .0 187.0       Recent Labs   Lab 09/25/24  1239 09/29/24  0547 09/29/24  1148    117* 116*   K 3.7 4.0 4.0    74* 79*   CO2 23.0 12.0* 22.0   BUN 8* 31* 40*   CREATSERUM 0.87 3.29* 3.13*   CA 9.1 7.8* 7.2*   MG 2.0 1.8  --    * 323* 363*       Recent Labs   Lab 09/25/24  1239 09/29/24  0547   ALT 69* 77*   * 94*   ALB 4.4 3.7       Recent Labs   Lab 09/29/24  1133   PGLU 349*           Impression/Plan:    #1.  NAKUL- in setting of profound volume depletion with severe N/V.  Aggressive IVF and will follow closely    #2.   Hyponatremia- suspect due to hypovolemia.  Can correct reasonably quickly given documented nl na on 9/25.  Follow with aggressive NS for now    #3.  Acidosis- suspect due to NAKUL and well as poss  component starvation ketosis.  No evidence of DKA.  Better with NS    #4.  Etoh use/abuse- CIWA     Thank you for allowing me to participate in the care of your patient. Please do not hesitate to call with any questions or concerns.         Cctime 35 minutes      Michelet Muhammad MD  9/29/2024  1:23 PM

## 2024-09-29 NOTE — CONSULTS
[facility]    Rambo Freitas Patient Status:  Inpatient    3/19/1980 MRN [id number]   Location [facility] 4SW-A Attending Jo Ann Oliva MD   Hosp Day # 0 PCP None Pcp     Date of Admission: 2024  Admission Diagnosis: Hyponatremia [E87.1]  Metabolic acidosis [E87.20]  Alcohol withdrawal syndrome without complication (HCC) [F10.930]  Acute renal failure, unspecified acute renal failure type (HCC) [N17.9]     History of Present Illness: 45 y/o with h/o IDDM, MVP, asthma, presented to ER with intractable N/V; apparently had >50 episodes of emesis.  Admits to dark material being vomited at times but not stephne blood.  - apparently, pt was incarcerated for 4.5 yr and was recently released.  Began drinking ETOH shortly after and has been drinking heavily for the subsequent five days.  Quit drinking 30 hrs ago bc of the vomiting.  Came to ER bc he felt his throat was  closing on him.  Denies f/c/cough/diarrhea/hemoptysis.  In ER, noted to have severe hyponatremia, NAKUL and acidosis.  Admitted for further care.  Currently, pt is tremulous; concerned about having to call his .     Past Medical History:    Back pain    2 bulging disks    Bilateral hearing loss, unspecified hearing loss type    Exercise-induced asthma (HCC)    Mitral valve prolapse    has been checked for heart problems and arrythmia at [facility] (about )    Rib fracture    left side    Sleep walking    Type 1 diabetes mellitus (HCC)      Past Surgical History:   Procedure Laterality Date    Tonsillectomy  age 17         No Known Allergies     Social History:   reports that he has been smoking cigarettes. He has a 10 pack-year smoking history. He has never used smokeless tobacco. He reports current alcohol use. He reports that he does not use drugs.      Family History:  Family History   Problem Relation Age of Onset    Breast Cancer Mother     Heart Disease Father         pacemaker    Heart Disease Sister         pacemaker      Heart Disease Paternal Aunt         pacemaker    Heart Disease Paternal Aunt         pacemaker         Home Medications:  Reviewed. Per EMR     Current Medications:    Current Facility-Administered Medications:     sodium chloride 0.9% infusion, , Intravenous, Continuous     REVIEW OF SYSTEMS:   As listed in HPI, otherwise ten point ROS is negative.     OBJECTIVE:  /75 (BP Location: Right arm)   Pulse 100   Temp 98.1 °F (36.7 °C) (Temporal)   Resp 17   Wt 235 lb 7.2 oz (106.8 kg)   SpO2 98%   BMI 27.92 kg/m²      On room air      Wt Readings from Last 3 Encounters:   24 235 lb 7.2 oz (106.8 kg)   24 230 lb (104.3 kg)   20 202 lb 6.4 oz (91.8 kg)        No intake/output data recorded.  I/O this shift:  In: 1070.6 [I.V.:1070.6]  Out: -      General appearance: alert, appears stated age, cooperative, and slowed mentation  Head: Normocephalic, without obvious abnormality, atraumatic  Neck: no adenopathy, no carotid bruit, no JVD, and supple, symmetrical, trachea midline  Back: symmetric, no curvature. ROM normal. No CVA tenderness.  Lungs: clear to auscultation bilaterally  Chest wall: no tenderness  Heart: regular rate and rhythm, tachy  Abdomen: soft, non-tender; bowel sounds normal; no masses,  no organomegaly  Extremities: extremities normal, atraumatic, no cyanosis or edema  Pulses: 2+ and symmetric  Skin: Skin color, texture, turgor normal. No rashes or lesions     Lab Results   Component Value Date    WBC 10.4 2024    RBC 4.94 2024    HGB 14.2 2024    HCT 39.4 2024    MCV 79.8 2024    MCH 28.7 2024    MCHC 36.0 2024    RDW 12.6 2024    .0 2024     Lab Results   Component Value Date     2024    K 4.0 2024    CL 74 2024    CO2 12.0 2024    VHCO3 17.1 2024    BUN 31 2024    CREATSERUM 3.29 2024     2024    CA 7.8 2024    ALKPHO 75 2024    ALT  77 09/29/2024    AST 94 09/29/2024    BILT 2.1 09/29/2024    ALB 3.7 09/29/2024    TP 5.9 09/29/2024     Lab Results   Component Value Date    INR 0.94 02/22/2019    INR 0.92 12/16/2015          Imaging: none available for review     ASSESSMENT/PLAN:  ETOH withdrawal - currently in window for withdrawal, 24-48hrs last drink  - CIWA per protocol  - MVI/folate/thiamine  - cont with scheduled librium  NAKUL - with severe associated hyponatremia and metabolic acidosis  - due to severe dehydration in assn with N/V and ETOH abuse  - cont with aggressive IVF hydration with saline.  Hold on 3% saline for now  - serial BMPs to prevent over correction of sodium  - renal following closely- d/w Dr. Muhammad  GI - N/V- had some coffee ground emesis as well  - at risk for liberty-Cavazos tear/gastritis  - monitor Hgb serially  - IV PPI bid  - clear liquids  - anti-emetics  - consider GI consult if there is stephen bleeding  IDDM - no clear evidence of DKA  - check for ketones  - IVFs  - ISS, start maint insulin  - hold on insulin gtt for now  Transaminitis- due  to ETOH abuse  - monitor  F/E/N- as above.  Clear only. IVFs  Proph - SCDs  Dispo - Full code  - d/w mother  - ICU care  Critical Care Time: 45 minutes    Maco Colón MD  9/29/2024  10:02 AM

## 2024-09-29 NOTE — ED PROVIDER NOTES
Patient Seen in: [facility] Emergency Department      History     Chief Complaint   Patient presents with    Nausea/Vomiting/Diarrhea    Swallowing Problem     Stated Complaint: vomiting    Subjective:   HPI    Patient is a 44-year-old male with history of asthma, alcohol abuse presenting for evaluation of intractable vomiting for the last 24 hours.  He states his entire esophagus and throat is painful, burning and feels like it was closing because he has been vomiting so much.  He estimates 50-60 episodes over the last 24 hours.  He was drinking heavily for about 4 to 5 days prior to that but has not been able to keep anything down in the last day.    Objective:   Past Medical History:    Back pain    2 bulging disks    Bilateral hearing loss, unspecified hearing loss type    Exercise-induced asthma (HCC)    Mitral valve prolapse    has been checked for heart problems and arrythmia at [place] (about 2013)    Rib fracture    left side    Sleep walking    Type 1 diabetes mellitus (HCC)              Past Surgical History:   Procedure Laterality Date    Tonsillectomy  age 17                Social History     Socioeconomic History    Marital status:    Tobacco Use    Smoking status: Every Day     Current packs/day: 1.00     Average packs/day: 1 pack/day for 10.0 years (10.0 ttl pk-yrs)     Types: Cigarettes    Smokeless tobacco: Never   Vaping Use    Vaping status: Every Day   Substance and Sexual Activity    Alcohol use: Yes     Alcohol/week: 0.0 standard drinks of alcohol     Comment: very rare stopped on christmas 2016    Drug use: No   Social History Narrative    Has six children        Construction work for years              Review of Systems    Positive for stated Chief Complaint: Nausea/Vomiting/Diarrhea and Swallowing Problem    Other systems are as noted in HPI.  Constitutional and vital signs reviewed.      All other systems reviewed and negative except as noted above.    Physical Exam      ED Triage Vitals [09/29/24 0544]   /67   Pulse 95   Resp 18   Temp 97.9 °F (36.6 °C)   Temp src    SpO2 99 %   O2 Device None (Room air)       Current Vitals:   Vital Signs  BP: 152/70  Pulse: 100  Resp: 19  Temp: 97.9 °F (36.6 °C)    Oxygen Therapy  SpO2: 99 %  O2 Device: None (Room air)            Physical Exam  Vitals and nursing note reviewed.   Constitutional:       Appearance: He is well-developed.   HENT:      Head: Normocephalic and atraumatic.   Eyes:      Conjunctiva/sclera: Conjunctivae normal.      Pupils: Pupils are equal, round, and reactive to light.   Cardiovascular:      Rate and Rhythm: Normal rate and regular rhythm.      Heart sounds: Normal heart sounds.   Pulmonary:      Effort: Pulmonary effort is normal.      Breath sounds: Normal breath sounds.   Abdominal:      General: Bowel sounds are normal.      Palpations: Abdomen is soft.      Comments: Mild diffuse tenderness.  Nondistended.  No rebound or guarding.   Musculoskeletal:         General: Normal range of motion.      Cervical back: Normal range of motion and neck supple.   Skin:     General: Skin is warm and dry.   Neurological:      Mental Status: He is alert and oriented to person, place, and time.   Psychiatric:      Comments: Mildly tremulous, appears anxious.               ED Course     Labs Reviewed   CBC WITH DIFFERENTIAL WITH PLATELET - Abnormal; Notable for the following components:       Result Value    MCV 79.8 (*)     Monocyte Absolute 1.05 (*)     All other components within normal limits   COMP METABOLIC PANEL (14) - Abnormal; Notable for the following components:    Glucose 323 (*)     Sodium 117 (*)     Chloride 74 (*)     CO2 12.0 (*)     Anion Gap 31 (*)     BUN 31 (*)     Creatinine 3.29 (*)     Calcium, Total 7.8 (*)     Calculated Osmolality 263 (*)     eGFR-Cr 23 (*)     AST 94 (*)     ALT 77 (*)     Bilirubin, Total 2.1 (*)     All other components within normal limits   LIPASE - Abnormal; Notable for the  following components:    Lipase 76 (*)     All other components within normal limits   VENOUS BLOOD GAS - Abnormal; Notable for the following components:    Venous pCO2 23 (*)     Venous pO2 51 (*)     Venous HCO3 17.1 (*)     Venous O2Hb 81.7 (*)     All other components within normal limits   LACTIC ACID RESPIRATORY - Abnormal; Notable for the following components:    Lactic Acid (Blood Gas) 6.2 (*)     All other components within normal limits   MAGNESIUM - Normal   ETHYL ALCOHOL - Normal   RAINBOW DRAW LAVENDER   RAINBOW DRAW LIGHT GREEN   RAINBOW DRAW BLUE   RAINBOW DRAW GOLD                      [facility]      44-year-old male with history of alcohol abuse presenting for evaluation of intractable vomiting for the last 24 hours.  Has not been able to keep anything down including alcohol.  He states his esophagus and throat are very painful from all the vomiting and he is requesting that we sedate him and put him on a ventilator because it is so painful.  Discussed that that would not be appropriate care at this time.  Is requesting emergent endoscopy to see how it looks and discussed that there is no indication for emergent EGD at this time.  He does look anxious here, will check a CIWA, hydrate him and give him a dose of Ativan.  Labs have been ordered in order to evaluate for anemia, electrolyte abnormality, dehydration.  Admission disposition: 9/29/2024  7:58 AM           Update at 7:05 AM.  Received a call from lab about patient's sodium of 117.  Multiple other lab abnormalities, BUN of 31 with creatinine 3.3, it was normal a few days ago.  Metabolic acidosis with a CO2 of 12.  He is an insulin dependent diabetic and his anion gap is 31 which raises the concern for DKA versus alcoholic ketoacidosis versus more straightforward dehydration.  Will check a venous blood gas to see what his pH is.        Update at 755.  pH is normal at 7.38.  More suspicious for significant dehydration +/- alcoholic ketoacidosis rather  than DKA.  Case discussed with hospitalist, nephrology as well as critical care.  Will hydrated but no insulin drip at this point.  Lactic acid is quite elevated at 6 although he does not have leukocytosis, has not had fevers or illnesses and does not seem to be infected at this point.  Would not start antibiotics.  He will go to the ICU.      A total of 35 minutes of critical care time (exclusive of billable procedures) was administered to manage the patient's critical lab values due to his NAKUL, critical hyponatremia, metabolic acidosis.  This involved direct patient intervention, complex decision making, and/or extensive discussions with the patient, family, and clinical staff.        Past Medical History-insulin-dependent diabetes, alcohol abuse    Differential diagnosis before testing included DKA, other dehydration, electrode abnormality, alcohol withdrawal    Co-morbidities that add to the complexity of management include: None    Testing ordered during this visit included labs, IV fluids      External chart review showed reviewed prior lab results      Discussion of management with hospitalist, nephrology, critical care      Medications Provided: Ativan, Zofran, Protonix            Disposition:    Admission  I have discussed with the patient the results of test, differential diagnosis, and treatment plan. They expressed clear understanding of these instructions and agrees to the plan provided.                                  Medical Decision Making      Disposition and Plan     Clinical Impression:  1. Hyponatremia    2. Acute renal failure, unspecified acute renal failure type (HCC)    3. Metabolic acidosis    4. Alcohol withdrawal syndrome without complication (HCC)         Disposition:  Admit  9/29/2024  7:58 am    Follow-up:  No follow-up provider specified.        Medications Prescribed:  Current Discharge Medication List                            Hospital Problems       Present on Admission  Date  Reviewed: 9/21/2020            ICD-10-CM Noted POA    * (Principal) Hyponatremia E87.1 9/29/2024 Unknown

## 2024-09-29 NOTE — ED QUICK NOTES
Orders for admission, patient is aware of plan and ready to go upstairs. Any questions, please call ED RN Hoda  at extension [phone].     Patient Covid vaccination status: Unvaccinated     COVID Test Ordered in ED: Rapid SARS-CoV-2 by PCR    COVID Suspicion at Admission: N/A    Running Infusions:  NS 1L    Mental Status/LOC at time of transport: A,Ox3    Other pertinent information:   CIWA score: 4   NIH score:  N/A

## 2024-09-29 NOTE — H&P
[facility]  [facility]     Rambo Freitas Patient Status:  Inpatient    3/19/1980 MRN [id number]   Location [facility] 4SW-A Attending Jo Ann Oliva MD   Hosp Day # 0 PCP None Pcp     Chief Complaint: nausea and vomiting    Subjective:    History of Present Illness:     Rambo Freitas is a 44 year old male with medical history of MVP, DM type I and asthma who presents to the ER with complaints of nausea and vomiting.  He reports that for 5 days he was drinking heavy and stopped 1 day ago.  He started having intractable nausea and vomiting for the past day which was associated with burning in his esophagus .  He felt like his throat was closing and swollen.  In the ER he was noted to have NAKUL with alcohol ketoacidosis and dehydration. He also reports his emesis was like coffee ground and he has a history of GERD    History/Other:    Past Medical History:  Past Medical History:    Asthma (HCC)    Back pain    2 bulging disks    Bilateral hearing loss, unspecified hearing loss type    Exercise-induced asthma (HCC)    Mitral valve prolapse    has been checked for heart problems and arrythmia at [place] (about )    Rib fracture    left side    Sleep walking    Type 1 diabetes mellitus (HCC)     Past Surgical History:   Past Surgical History:   Procedure Laterality Date    Tonsillectomy  age 17      Family History:   Family History   Problem Relation Age of Onset    Breast Cancer Mother     Heart Disease Father         pacemaker    Heart Disease Sister         pacemaker     Heart Disease Paternal Aunt         pacemaker    Heart Disease Paternal Aunt         pacemaker     Social History:    reports that he has quit smoking. His smoking use included cigarettes. He has a 10 pack-year smoking history. He has never used smokeless tobacco. He reports that he does not currently use alcohol after a past usage of about 3.0 standard drinks of alcohol per week. He reports that he does not  use drugs.     Allergies: No Known Allergies    Medications:    Current Facility-Administered Medications on File Prior to Encounter   Medication Dose Route Frequency Provider Last Rate Last Admin    [COMPLETED] sodium chloride 0.9 % IV bolus 1,000 mL  1,000 mL Intravenous Once Ren Leach DO   Stopped at 09/25/24 3609     Current Outpatient Medications on File Prior to Encounter   Medication Sig Dispense Refill    gabapentin 300 MG Oral Cap Take 1 capsule (300 mg total) by mouth 2 (two) times daily. (Patient taking differently: Take 1 capsule (300 mg total) by mouth 2 (two) times daily. 300 mg in am, 600 mg in pm) 60 capsule 1    albuterol 108 (90 Base) MCG/ACT Inhalation Aero Soln Inhale 2 puffs into the lungs every 6 (six) hours as needed for Wheezing.      insulin NPH & regular 70/30 (70-30) 100 Units/mL Subcutaneous Suspension Inject 4 Units into the skin 2 (two) times daily before meals.      SYMBICORT 160-4.5 MCG/ACT Inhalation Aerosol INHALE 2 PUFFS BY MOUTH INTO THE LUNGS TWICE DAILY 30.6 g 0    OMEPRAZOLE 40 MG Oral Capsule Delayed Release TAKE 1 CAPSULE(40 MG) BY MOUTH DAILY BEFORE A MEAL 30 capsule 5       Review of Systems:   A comprehensive review of systems was completed.    Pertinent positives and negatives noted in the HPI.    Objective:   Physical Exam:    /75 (BP Location: Right arm)   Pulse 100   Temp 98.1 °F (36.7 °C) (Temporal)   Resp 17   Wt 235 lb 7.2 oz (106.8 kg)   SpO2 98%   BMI 27.92 kg/m²   General: No acute distress, Alert  Respiratory: No rhonchi, no wheezes  Cardiovascular: S1, S2. Regular rate and rhythm  Abdomen: Soft, Non-tender, non-distended, positive bowel sounds  Neuro: No new focal deficits  Extremities: No edema      Results:    Labs:      Labs Last 24 Hours:    Recent Labs   Lab 09/25/24  1239 09/29/24  0547   RBC 5.46 4.94   HGB 15.7 14.2   HCT 44.3 39.4   MCV 81.1 79.8*   MCH 28.8 28.7   MCHC 35.4 36.0   RDW 12.6 12.6   NEPRELIM 4.00 7.62   WBC 6.2 10.4   PLT  225.0 187.0       Recent Labs   Lab 09/25/24  1239 09/29/24  0547   * 323*   BUN 8* 31*   CREATSERUM 0.87 3.29*   EGFRCR 108 23*   CA 9.1 7.8*   ALB 4.4 3.7    117*   K 3.7 4.0    74*   CO2 23.0 12.0*   ALKPHO 88 75   * 94*   ALT 69* 77*   BILT 2.4* 2.1*   TP 7.2 5.9       Lab Results   Component Value Date    INR 0.94 02/22/2019    INR 0.92 12/16/2015       No results for input(s): \"TROP\", \"TROPHS\", \"CK\" in the last 168 hours.    No results for input(s): \"TROP\", \"PBNP\" in the last 168 hours.    No results for input(s): \"PCT\" in the last 168 hours.    Imaging: Imaging data reviewed in Epic.    Assessment & Plan:      #NAKUL  #Hyponatremia  #Metabolic acidosis due to alcohol ketoacidosis and dehydration  -IVF  -renal on consult    #Alcohol abuse  #Elevated LFT  -recent 5 day binge  -monitor for withdrawls  -MTV/ FA. Thiamine    #DM type II  -has not taken insulin  -while npo will order half of basal insulin  -novolog SSI    #coffee ground emesis  -PPI  -NPO  -GI consult  -trend hemoglobin    Plan of care discussed with patient, Rn and ER physician    Jo Ann Oliva MD    Supplementary Documentation:     The 21st Century Cures Act makes medical notes like these available to patients in the interest of transparency. Please be advised this is a medical document. Medical documents are intended to carry relevant information, facts as evident, and the clinical opinion of the practitioner. The medical note is intended as peer to peer communication and may appear blunt or direct. It is written in medical language and may contain abbreviations or verbiage that are unfamiliar.

## 2024-09-29 NOTE — PLAN OF CARE
Received patient from the ED on room air. Patient reports 50-60 episodes of emesis prior to arrival. Patients sodium levels low. Total of 3L of normal saline given. Repeating labs every 4 hours. Continuous NS infusion. Escalating CIWA. Treating with ativan per protocol. Mag replaced per protocol. Patient needs to call [employer] tomorrow for check in. Patient checked in with [employer] today. Needs to check in every 24 hours. Mother was at bedside and updated on plan of care.

## 2024-09-29 NOTE — ED INITIAL ASSESSMENT (HPI)
Pt arrives to ED via EMS for c/o vomiting dark emesis for the past 24 hours. Pt states that his throat feels like it is getting more and more swollen since yesterday and it is hard for him to swallow. Pt able to speak in complete sentences but muffled. Oxygen 100% on RA. Pt has been binge drinking the last 5 days, states his last drink was over 24 hours ago. Pt states hx of seizure in 2017 from withdrawal. Arrives with ankle monitor, just released from retirement 9/13/24.

## 2024-09-29 NOTE — CM/SW NOTE
ELVIE received call from Kofi at the [employer], calling to verify patient location as he is currently a parolee.

## 2024-09-29 NOTE — ED QUICK NOTES
Report called to [facility] ICU RN. Pt ready to be transferred to ICU. Pt remains resting quietly, easy WOB.

## 2024-09-30 ENCOUNTER — ANESTHESIA (OUTPATIENT)
Dept: ENDOSCOPY | Facility: HOSPITAL | Age: 44
End: 2024-09-30
Payer: MEDICAID

## 2024-09-30 ENCOUNTER — ANESTHESIA EVENT (OUTPATIENT)
Dept: ENDOSCOPY | Facility: HOSPITAL | Age: 44
End: 2024-09-30
Payer: MEDICAID

## 2024-09-30 LAB
ALBUMIN SERPL-MCNC: 3.3 G/DL (ref 3.2–4.8)
ALBUMIN/GLOB SERPL: 1.7 {RATIO} (ref 1–2)
ALP LIVER SERPL-CCNC: 64 U/L
ALT SERPL-CCNC: 72 U/L
ANION GAP SERPL CALC-SCNC: 10 MMOL/L (ref 0–18)
ANION GAP SERPL CALC-SCNC: 7 MMOL/L (ref 0–18)
ANION GAP SERPL CALC-SCNC: 7 MMOL/L (ref 0–18)
ANION GAP SERPL CALC-SCNC: 8 MMOL/L (ref 0–18)
ANION GAP SERPL CALC-SCNC: 9 MMOL/L (ref 0–18)
ANION GAP SERPL CALC-SCNC: 9 MMOL/L (ref 0–18)
AST SERPL-CCNC: 102 U/L (ref ?–34)
BASOPHILS # BLD AUTO: 0.01 X10(3) UL (ref 0–0.2)
BASOPHILS NFR BLD AUTO: 0.2 %
BILIRUB SERPL-MCNC: 1.9 MG/DL (ref 0.3–1.2)
BUN BLD-MCNC: 15 MG/DL (ref 9–23)
BUN BLD-MCNC: 21 MG/DL (ref 9–23)
BUN BLD-MCNC: 27 MG/DL (ref 9–23)
BUN BLD-MCNC: 31 MG/DL (ref 9–23)
BUN BLD-MCNC: 31 MG/DL (ref 9–23)
BUN BLD-MCNC: 35 MG/DL (ref 9–23)
CALCIUM BLD-MCNC: 7.4 MG/DL (ref 8.7–10.4)
CALCIUM BLD-MCNC: 7.5 MG/DL (ref 8.7–10.4)
CALCIUM BLD-MCNC: 7.5 MG/DL (ref 8.7–10.4)
CALCIUM BLD-MCNC: 7.8 MG/DL (ref 8.7–10.4)
CALCIUM BLD-MCNC: 7.8 MG/DL (ref 8.7–10.4)
CALCIUM BLD-MCNC: 7.9 MG/DL (ref 8.7–10.4)
CHLORIDE SERPL-SCNC: 90 MMOL/L (ref 98–112)
CHLORIDE SERPL-SCNC: 93 MMOL/L (ref 98–112)
CHLORIDE SERPL-SCNC: 97 MMOL/L (ref 98–112)
CHLORIDE SERPL-SCNC: 97 MMOL/L (ref 98–112)
CO2 SERPL-SCNC: 24 MMOL/L (ref 21–32)
CO2 SERPL-SCNC: 26 MMOL/L (ref 21–32)
CO2 SERPL-SCNC: 26 MMOL/L (ref 21–32)
CO2 SERPL-SCNC: 27 MMOL/L (ref 21–32)
CO2 SERPL-SCNC: 27 MMOL/L (ref 21–32)
CO2 SERPL-SCNC: 28 MMOL/L (ref 21–32)
CREAT BLD-MCNC: 0.92 MG/DL
CREAT BLD-MCNC: 1.11 MG/DL
CREAT BLD-MCNC: 1.27 MG/DL
CREAT BLD-MCNC: 1.47 MG/DL
CREAT BLD-MCNC: 1.47 MG/DL
CREAT BLD-MCNC: 1.6 MG/DL
EGFRCR SERPLBLD CKD-EPI 2021: 105 ML/MIN/1.73M2 (ref 60–?)
EGFRCR SERPLBLD CKD-EPI 2021: 54 ML/MIN/1.73M2 (ref 60–?)
EGFRCR SERPLBLD CKD-EPI 2021: 60 ML/MIN/1.73M2 (ref 60–?)
EGFRCR SERPLBLD CKD-EPI 2021: 60 ML/MIN/1.73M2 (ref 60–?)
EGFRCR SERPLBLD CKD-EPI 2021: 71 ML/MIN/1.73M2 (ref 60–?)
EGFRCR SERPLBLD CKD-EPI 2021: 83 ML/MIN/1.73M2 (ref 60–?)
EOSINOPHIL # BLD AUTO: 0.02 X10(3) UL (ref 0–0.7)
EOSINOPHIL NFR BLD AUTO: 0.3 %
ERYTHROCYTE [DISTWIDTH] IN BLOOD BY AUTOMATED COUNT: 12.3 %
ERYTHROCYTE [DISTWIDTH] IN BLOOD BY AUTOMATED COUNT: 12.3 %
ERYTHROCYTE [DISTWIDTH] IN BLOOD BY AUTOMATED COUNT: 12.6 %
ERYTHROCYTE [DISTWIDTH] IN BLOOD BY AUTOMATED COUNT: 12.7 %
GLOBULIN PLAS-MCNC: 1.9 G/DL (ref 2–3.5)
GLUCOSE BLD-MCNC: 189 MG/DL (ref 70–99)
GLUCOSE BLD-MCNC: 201 MG/DL (ref 70–99)
GLUCOSE BLD-MCNC: 215 MG/DL (ref 70–99)
GLUCOSE BLD-MCNC: 224 MG/DL (ref 70–99)
GLUCOSE BLD-MCNC: 254 MG/DL (ref 70–99)
GLUCOSE BLD-MCNC: 268 MG/DL (ref 70–99)
GLUCOSE BLD-MCNC: 271 MG/DL (ref 70–99)
GLUCOSE BLD-MCNC: 286 MG/DL (ref 70–99)
GLUCOSE BLD-MCNC: 286 MG/DL (ref 70–99)
GLUCOSE BLD-MCNC: 330 MG/DL (ref 70–99)
HCT VFR BLD AUTO: 33.7 %
HCT VFR BLD AUTO: 34.3 %
HCT VFR BLD AUTO: 35.4 %
HCT VFR BLD AUTO: 36.5 %
HGB BLD-MCNC: 12.6 G/DL
HGB BLD-MCNC: 12.8 G/DL
HGB BLD-MCNC: 13.1 G/DL
HGB BLD-MCNC: 13.4 G/DL
IMM GRANULOCYTES # BLD AUTO: 0.03 X10(3) UL (ref 0–1)
IMM GRANULOCYTES NFR BLD: 0.5 %
LYMPHOCYTES # BLD AUTO: 0.81 X10(3) UL (ref 1–4)
LYMPHOCYTES NFR BLD AUTO: 12.5 %
MAGNESIUM SERPL-MCNC: 3.2 MG/DL (ref 1.6–2.6)
MCH RBC QN AUTO: 28.5 PG (ref 26–34)
MCH RBC QN AUTO: 28.9 PG (ref 26–34)
MCH RBC QN AUTO: 29 PG (ref 26–34)
MCH RBC QN AUTO: 29.2 PG (ref 26–34)
MCHC RBC AUTO-ENTMCNC: 36.7 G/DL (ref 31–37)
MCHC RBC AUTO-ENTMCNC: 37 G/DL (ref 31–37)
MCHC RBC AUTO-ENTMCNC: 37.3 G/DL (ref 31–37)
MCHC RBC AUTO-ENTMCNC: 37.4 G/DL (ref 31–37)
MCV RBC AUTO: 76.2 FL
MCV RBC AUTO: 77.8 FL
MCV RBC AUTO: 78.7 FL
MCV RBC AUTO: 78.8 FL
MONOCYTES # BLD AUTO: 0.67 X10(3) UL (ref 0.1–1)
MONOCYTES NFR BLD AUTO: 10.3 %
NEUTROPHILS # BLD AUTO: 4.94 X10 (3) UL (ref 1.5–7.7)
NEUTROPHILS # BLD AUTO: 4.94 X10(3) UL (ref 1.5–7.7)
NEUTROPHILS NFR BLD AUTO: 76.2 %
OSMOLALITY SERPL CALC.SUM OF ELEC: 275 MOSM/KG (ref 275–295)
OSMOLALITY SERPL CALC.SUM OF ELEC: 282 MOSM/KG (ref 275–295)
OSMOLALITY SERPL CALC.SUM OF ELEC: 282 MOSM/KG (ref 275–295)
OSMOLALITY SERPL CALC.SUM OF ELEC: 283 MOSM/KG (ref 275–295)
PLATELET # BLD AUTO: 119 10(3)UL (ref 150–450)
PLATELET # BLD AUTO: 124 10(3)UL (ref 150–450)
PLATELET # BLD AUTO: 126 10(3)UL (ref 150–450)
PLATELET # BLD AUTO: 129 10(3)UL (ref 150–450)
PLATELETS.RETICULATED NFR BLD AUTO: 9.8 % (ref 0–7)
POTASSIUM SERPL-SCNC: 3.3 MMOL/L (ref 3.5–5.1)
POTASSIUM SERPL-SCNC: 3.3 MMOL/L (ref 3.5–5.1)
POTASSIUM SERPL-SCNC: 3.5 MMOL/L (ref 3.5–5.1)
POTASSIUM SERPL-SCNC: 3.5 MMOL/L (ref 3.5–5.1)
POTASSIUM SERPL-SCNC: 3.7 MMOL/L (ref 3.5–5.1)
POTASSIUM SERPL-SCNC: 3.7 MMOL/L (ref 3.5–5.1)
PROT SERPL-MCNC: 5.2 G/DL (ref 5.7–8.2)
RBC # BLD AUTO: 4.41 X10(6)UL
RBC # BLD AUTO: 4.42 X10(6)UL
RBC # BLD AUTO: 4.49 X10(6)UL
RBC # BLD AUTO: 4.64 X10(6)UL
SODIUM SERPL-SCNC: 124 MMOL/L (ref 136–145)
SODIUM SERPL-SCNC: 127 MMOL/L (ref 136–145)
SODIUM SERPL-SCNC: 128 MMOL/L (ref 136–145)
SODIUM SERPL-SCNC: 128 MMOL/L (ref 136–145)
SODIUM SERPL-SCNC: 132 MMOL/L (ref 136–145)
SODIUM SERPL-SCNC: 132 MMOL/L (ref 136–145)
WBC # BLD AUTO: 5.1 X10(3) UL (ref 4–11)
WBC # BLD AUTO: 6.5 X10(3) UL (ref 4–11)
WBC # BLD AUTO: 6.7 X10(3) UL (ref 4–11)
WBC # BLD AUTO: 6.9 X10(3) UL (ref 4–11)

## 2024-09-30 PROCEDURE — 99291 CRITICAL CARE FIRST HOUR: CPT | Performed by: INTERNAL MEDICINE

## 2024-09-30 PROCEDURE — 0DB78ZX EXCISION OF STOMACH, PYLORUS, VIA NATURAL OR ARTIFICIAL OPENING ENDOSCOPIC, DIAGNOSTIC: ICD-10-PCS | Performed by: INTERNAL MEDICINE

## 2024-09-30 PROCEDURE — 99232 SBSQ HOSP IP/OBS MODERATE 35: CPT | Performed by: HOSPITALIST

## 2024-09-30 PROCEDURE — 0DB98ZX EXCISION OF DUODENUM, VIA NATURAL OR ARTIFICIAL OPENING ENDOSCOPIC, DIAGNOSTIC: ICD-10-PCS | Performed by: INTERNAL MEDICINE

## 2024-09-30 PROCEDURE — 99291 CRITICAL CARE FIRST HOUR: CPT | Performed by: EMERGENCY MEDICINE

## 2024-09-30 RX ORDER — INSULIN DEGLUDEC 100 U/ML
20 INJECTION, SOLUTION SUBCUTANEOUS DAILY
Status: DISCONTINUED | OUTPATIENT
Start: 2024-10-01 | End: 2024-09-30

## 2024-09-30 RX ORDER — LIDOCAINE HYDROCHLORIDE 10 MG/ML
INJECTION, SOLUTION EPIDURAL; INFILTRATION; INTRACAUDAL; PERINEURAL AS NEEDED
Status: DISCONTINUED | OUTPATIENT
Start: 2024-09-30 | End: 2024-09-30 | Stop reason: SURG

## 2024-09-30 RX ORDER — INSULIN DEGLUDEC 100 U/ML
25 INJECTION, SOLUTION SUBCUTANEOUS DAILY
Status: DISCONTINUED | OUTPATIENT
Start: 2024-10-01 | End: 2024-10-02

## 2024-09-30 RX ORDER — PANTOPRAZOLE SODIUM 40 MG/1
40 TABLET, DELAYED RELEASE ORAL
Status: DISCONTINUED | OUTPATIENT
Start: 2024-10-01 | End: 2024-10-02

## 2024-09-30 RX ORDER — NALOXONE HYDROCHLORIDE 0.4 MG/ML
80 INJECTION, SOLUTION INTRAMUSCULAR; INTRAVENOUS; SUBCUTANEOUS AS NEEDED
Status: DISCONTINUED | OUTPATIENT
Start: 2024-09-30 | End: 2024-09-30 | Stop reason: HOSPADM

## 2024-09-30 RX ORDER — SODIUM CHLORIDE, SODIUM LACTATE, POTASSIUM CHLORIDE, CALCIUM CHLORIDE 600; 310; 30; 20 MG/100ML; MG/100ML; MG/100ML; MG/100ML
INJECTION, SOLUTION INTRAVENOUS CONTINUOUS
Status: CANCELLED | OUTPATIENT
Start: 2024-09-30

## 2024-09-30 RX ORDER — SODIUM CHLORIDE, SODIUM LACTATE, POTASSIUM CHLORIDE, CALCIUM CHLORIDE 600; 310; 30; 20 MG/100ML; MG/100ML; MG/100ML; MG/100ML
INJECTION, SOLUTION INTRAVENOUS CONTINUOUS PRN
Status: DISCONTINUED | OUTPATIENT
Start: 2024-09-30 | End: 2024-09-30 | Stop reason: SURG

## 2024-09-30 RX ORDER — ENOXAPARIN SODIUM 100 MG/ML
40 INJECTION SUBCUTANEOUS DAILY
Status: DISCONTINUED | OUTPATIENT
Start: 2024-10-01 | End: 2024-10-02

## 2024-09-30 RX ORDER — POTASSIUM CHLORIDE 14.9 MG/ML
20 INJECTION INTRAVENOUS ONCE
Status: COMPLETED | OUTPATIENT
Start: 2024-09-30 | End: 2024-09-30

## 2024-09-30 RX ORDER — SUCRALFATE ORAL 1 G/10ML
1 SUSPENSION ORAL
Status: DISCONTINUED | OUTPATIENT
Start: 2024-09-30 | End: 2024-10-01

## 2024-09-30 RX ORDER — INSULIN DEGLUDEC 100 U/ML
30 INJECTION, SOLUTION SUBCUTANEOUS DAILY
Status: DISCONTINUED | OUTPATIENT
Start: 2024-10-01 | End: 2024-09-30

## 2024-09-30 RX ORDER — LOPERAMIDE HCL 2 MG
2 CAPSULE ORAL 4 TIMES DAILY PRN
Status: DISCONTINUED | OUTPATIENT
Start: 2024-09-30 | End: 2024-10-02

## 2024-09-30 RX ADMIN — LIDOCAINE HYDROCHLORIDE 50 MG: 10 INJECTION, SOLUTION EPIDURAL; INFILTRATION; INTRACAUDAL; PERINEURAL at 10:52:00

## 2024-09-30 RX ADMIN — SODIUM CHLORIDE, SODIUM LACTATE, POTASSIUM CHLORIDE, CALCIUM CHLORIDE: 600; 310; 30; 20 INJECTION, SOLUTION INTRAVENOUS at 11:02:00

## 2024-09-30 RX ADMIN — SODIUM CHLORIDE, SODIUM LACTATE, POTASSIUM CHLORIDE, CALCIUM CHLORIDE: 600; 310; 30; 20 INJECTION, SOLUTION INTRAVENOUS at 10:52:00

## 2024-09-30 NOTE — PLAN OF CARE
Assumed care of pt following shift report. Pt is A&Ox4. Seizure precautions in place. CIWA protocol followed; Max CIWA of 2. Reports intermittent gastric reflux and sore throat; administered prn Maalox. Afebrile and normotensive. Tele monitor reads NSR. Stable on room air. Pt had 3 loose BM's during shift. Pt still reporting significant gastric reflux/heart burn. Administered prn Maalox. Pt also reported some nausea that resolved w/ Zofran administration. Adequate urine output. Potassium replaced per protocol. See MAR and flowsheets for additional assessments.

## 2024-09-30 NOTE — BRIEF OP NOTE
Pre-Operative Diagnosis: coffee ground emesis     Post-Operative Diagnosis: Esophagitis, Hiatal Hernia, Gastritis      Procedure Performed:   ESOPHAGOGASTRODUODENOSCOPY (EGD) with biopsies    Surgeons and Role:     * Manuel Obrien MD - Primary    Assistant(s):        Surgical Findings: see above     Specimen: see op note     Estimated Blood Loss: No data recorded    Dictation Number:  none    Manuel Obrien MD  9/30/2024  11:11 AM

## 2024-09-30 NOTE — ANESTHESIA PREPROCEDURE EVALUATION
PRE-OP EVALUATION    Patient Name: Rambo Freitas    Admit Diagnosis: Hyponatremia [E87.1]  Metabolic acidosis [E87.20]  Alcohol withdrawal syndrome without complication (HCC) [F10.930]  Acute renal failure, unspecified acute renal failure type (HCC) [N17.9]    Pre-op Diagnosis: coffee ground emesis    ESOPHAGOGASTRODUODENOSCOPY (EGD)    Anesthesia Procedure: ESOPHAGOGASTRODUODENOSCOPY (EGD)    Surgeons and Role:     * Manuel Obrien MD - Primary    Pre-op vitals reviewed.  Temp: 98.4 °F (36.9 °C)  Pulse: 91  Resp: 12  BP: 154/87  SpO2: 92 %  Body mass index is 28.84 kg/m².    Current medications reviewed.  Hospital Medications:   [COMPLETED] potassium chloride 20 mEq/100mL IVPB premix 20 mEq  20 mEq Intravenous Once    [START ON 10/1/2024] insulin degludec (Tresiba) 100 units/mL flextouch 20 Units  20 Units Subcutaneous Daily    insulin aspart (NovoLOG) 100 Units/mL FlexPen 4 Units  4 Units Subcutaneous TID CC    [COMPLETED] ondansetron (Zofran) 4 MG/2ML injection        [COMPLETED] sodium chloride 0.9 % IV bolus 1,000 mL  1,000 mL Intravenous Once    [COMPLETED] LORazepam (Ativan) 2 mg/mL injection 0.5 mg  0.5 mg Intravenous Once    [COMPLETED] pantoprazole (Protonix) 40 mg in sodium chloride 0.9% PF 10 mL IV push  40 mg Intravenous Once    [COMPLETED] sodium chloride 0.9% infusion   Intravenous Once    [] sodium chloride 0.9% infusion   Intravenous Continuous    [COMPLETED] sodium chloride 0.9% infusion 1,000 mL  1,000 mL Intravenous Once    [COMPLETED] LORazepam (Ativan) 2 mg/mL injection 0.5 mg  0.5 mg Intravenous Once    [Held by provider] heparin (Porcine) 5000 UNIT/ML injection 5,000 Units  5,000 Units Subcutaneous Q8H MEGAN    ondansetron (Zofran) 4 MG/2ML injection 4 mg  4 mg Intravenous Q6H PRN    prochlorperazine (Compazine) 10 MG/2ML injection 5 mg  5 mg Intravenous Q8H PRN    alum-mag hydroxide-simethicone (Maalox) 200-200-20 MG/5ML oral suspension 30 mL  30 mL Oral QID PRN    pantoprazole  (Protonix) 40 mg in sodium chloride 0.9% PF 10 mL IV push  40 mg Intravenous Q12H    [COMPLETED] magnesium sulfate in sterile water for injection 2 g/50mL IVPB premix 2 g  2 g Intravenous Once    albuterol (Ventolin HFA) 108 (90 Base) MCG/ACT inhaler 2 puff  2 puff Inhalation Q6H PRN    glucose (Dex4) 15 GM/59ML oral liquid 15 g  15 g Oral Q15 Min PRN    Or    glucose (Glutose) 40% oral gel 15 g  15 g Oral Q15 Min PRN    Or    glucose-vitamin C (Dex-4) chewable tab 4 tablet  4 tablet Oral Q15 Min PRN    Or    dextrose 50% injection 50 mL  50 mL Intravenous Q15 Min PRN    Or    glucose (Dex4) 15 GM/59ML oral liquid 30 g  30 g Oral Q15 Min PRN    Or    glucose (Glutose) 40% oral gel 30 g  30 g Oral Q15 Min PRN    Or    glucose-vitamin C (Dex-4) chewable tab 8 tablet  8 tablet Oral Q15 Min PRN    sodium chloride 0.9% infusion   Intravenous Continuous    polyethylene glycol (PEG 3350) (Miralax) 17 g oral packet 17 g  17 g Oral Daily PRN    sennosides (Senokot) tab 17.2 mg  17.2 mg Oral Nightly PRN    bisacodyl (Dulcolax) 10 MG rectal suppository 10 mg  10 mg Rectal Daily PRN    fleet enema (Fleet) rectal enema 133 mL  1 enema Rectal Once PRN    insulin aspart (NovoLOG) 100 Units/mL FlexPen 1-10 Units  1-10 Units Subcutaneous TID AC and HS    gabapentin (Neurontin) cap 300 mg  300 mg Oral QAM    gabapentin (Neurontin) tab 600 mg  600 mg Oral Nightly    [COMPLETED] chlorproMAZINE (Thorazine) 25 MG/ML injection 25 mg  25 mg Intramuscular Once    [COMPLETED] chlordiazePOXIDE (Librium) cap 25 mg  25 mg Oral Q6H    [COMPLETED] thiamine 100 mg/mL injection 100 mg  100 mg Intravenous Once    thiamine (Vitamin B1) tab 100 mg  100 mg Oral Daily    multivitamin with minerals (Thera M Plus) tab 1 tablet  1 tablet Oral Daily    folic acid (Folvite) tab 1 mg  1 mg Oral Daily    [COMPLETED] sodium chloride 0.9% infusion 1,000 mL  1,000 mL Intravenous Once       Outpatient Medications:     Medications Prior to Admission   Medication  Sig Dispense Refill Last Dose    gabapentin 300 MG Oral Cap Take 1 capsule (300 mg total) by mouth 2 (two) times daily. (Patient taking differently: Take 1 capsule (300 mg total) by mouth 2 (two) times daily. 300 mg in am, 600 mg in pm) 60 capsule 1 9/28/2024    albuterol 108 (90 Base) MCG/ACT Inhalation Aero Soln Inhale 2 puffs into the lungs every 6 (six) hours as needed for Wheezing.   More than a month    insulin NPH & regular 70/30 (70-30) 100 Units/mL Subcutaneous Suspension Inject 4 Units into the skin 2 (two) times daily before meals.   9/26/2024    SYMBICORT 160-4.5 MCG/ACT Inhalation Aerosol INHALE 2 PUFFS BY MOUTH INTO THE LUNGS TWICE DAILY 30.6 g 0 More than a month    OMEPRAZOLE 40 MG Oral Capsule Delayed Release TAKE 1 CAPSULE(40 MG) BY MOUTH DAILY BEFORE A MEAL 30 capsule 5 9/28/2024       Allergies: Patient has no known allergies.      Anesthesia Evaluation    Patient summary reviewed.    Anesthetic Complications  (-) history of anesthetic complications         GI/Hepatic/Renal      (+) GERD                           Cardiovascular        Exercise tolerance: good     MET: >4    (-) obesity  (+) hypertension     (-) CAD                  (-) angina     (-) JENNINGS         Endo/Other      (+) diabetes                            Pulmonary      (+) asthma  (-) COPD       (-) shortness of breath     (-) sleep apnea       Neuro/Psych               (+) seizures  (+) neuromuscular disease             Patient Active Problem List:     Lumbar disc herniation     Bilateral hearing loss, unspecified hearing loss type     Injury of adductor muscle and tendon of right thigh     Labral tear of right hip joint     Primary osteoarthritis of right hip     Injury of adductor muscle and tendon of right thigh, sequela     Elevated blood pressure, situational     DDD (degenerative disc disease), lumbar     Closed displaced fracture of acromial end of left clavicle with routine healing, subsequent encounter     Lumbar facet  arthropathy     Opiate use     Chronic midline low back pain without sciatica     Gastroesophageal reflux disease without esophagitis     Exercise-induced asthma (HCC)     Screening for disorder of blood and blood-forming organs     Folliculitis     Alcohol withdrawal syndrome with complication (HCC)     Elevated blood pressure     Hand pain, right     Seizure (HCC)     Hyponatremia     NAKUL (acute kidney injury) (HCC)     Metabolic acidosis     Alcohol withdrawal syndrome without complication (HCC)            Past Surgical History:   Procedure Laterality Date    Tonsillectomy  age 17     Social History     Socioeconomic History    Marital status:    Tobacco Use    Smoking status: Former     Current packs/day: 1.00     Average packs/day: 1 pack/day for 10.0 years (10.0 ttl pk-yrs)     Types: Cigarettes    Smokeless tobacco: Never   Vaping Use    Vaping status: Some Days    Substances: Nicotine   Substance and Sexual Activity    Alcohol use: Not Currently     Alcohol/week: 3.0 standard drinks of alcohol     Types: 3 Standard drinks or equivalent per week     Comment: Resumed drinking after getting out of senior living. Drank for 5 days. 750mL of vodka per day    Drug use: No     History   Drug Use No     Available pre-op labs reviewed.  Lab Results   Component Value Date    WBC 6.9 09/30/2024    RBC 4.64 09/30/2024    HGB 13.4 09/30/2024    HCT 36.5 (L) 09/30/2024    MCV 78.7 (L) 09/30/2024    MCH 28.9 09/30/2024    MCHC 36.7 09/30/2024    RDW 12.6 09/30/2024    .0 (L) 09/30/2024     Lab Results   Component Value Date     (L) 09/30/2024     (L) 09/30/2024    K 3.7 09/30/2024    K 3.7 09/30/2024    CL 93 (L) 09/30/2024    CL 93 (L) 09/30/2024    CO2 26.0 09/30/2024    CO2 26.0 09/30/2024    BUN 31 (H) 09/30/2024    BUN 31 (H) 09/30/2024    CREATSERUM 1.47 (H) 09/30/2024    CREATSERUM 1.47 (H) 09/30/2024     (H) 09/30/2024     (H) 09/30/2024    CA 7.5 (L) 09/30/2024    CA 7.5 (L)  09/30/2024            Airway      Mallampati: II  Mouth opening: >3 FB  TM distance: > 6 cm  Neck ROM: full Cardiovascular    Cardiovascular exam normal.  Rhythm: regular  Rate: normal     Dental  Comment: Patient denies any loose/missing/cracked teeth. No gross abnormalities or loose teeth noted on exam.      Dentition appears grossly intact         Pulmonary    Pulmonary exam normal.                 Other findings              ASA: 2   Plan: MAC  NPO status verified and patient meets guidelines.    Post-procedure pain management plan discussed with surgeon and patient.    Comment:     A detailed discussion about the anesthetic plan was held with Rambo Freitas in the preoperative area. Benefits and risks of MAC anesthesia were discussed, including intraoperative awareness/recall, PONV, reasonable expectations of post-operative pain/discomfort, aspiration, conversion to general anesthesia, dental injury, pressure/nerve injuries from positioning, and other serious but rare complications (life-threatening cardiopulmonary events). All questions were answered appropriately and patient demonstrated understanding of realistic expectations and risks of undergoing anesthesia. Rambo Freitas consents to receiving anesthesia and wishes to proceed.  Plan/risks discussed with: patient                Present on Admission:  **None**

## 2024-09-30 NOTE — OPERATIVE REPORT
PATIENT NAME: Rambo Freitas  MRN: [id number]  DATE OF OPERATION:  9/30/24  REFERRING PHYSICIAN: Dr. Oliva  Medications:  MAC  PREOPERATIVE DIAGNOSIS    Coffee ground emesis  POSTOPERATIVE DIAGNOSIS:  Severe esophagitis with confluent ulceration of the distal 15 cm of the esophagus.  No active bleeding.  2 - 3 cm hiatal hernia.  Diffuse gastritis.  Biopsies were done to assess for h pylori infection.   Normal duodenum.  Biopsies were done to assess for celiac disease.     PROCEDURE PERFORMED:                Esophagogastroduodenoscopy with biopsies   Endoscopist:  Manuel Obrien MD     PROCEDURE AND FINDINGS: The patient was placed into the left lateral decubitus after informed consent was obtained.  All questions were answered.  An updated history and physical were performed and an ASA score of 2 was assigned. Informed consent was obtained prior to the procedure, with review of possible complications including bleeding, infection, and missed cancer.  MAC sedation was administered.             The OMsignal video gastroscope was introduced into the mouth and passed into the esophogus after administration of MAC sedation.  The entire examined esophagus was remarkable for severe esophagitis with confluent ulceration of the distal 15 cm of the esophagus.  No active bleeding noted.  There was a 2 - 3 cm hiatal hernia.  The entire examined stomach,  including retroflexion view was remarkable for diffuse gastritis with friability, edema and erythema.  Biopsies were done to assess for h pylori infection.   The examined duodenum to the third portion was normal.  Biopsies were done to assess for celiac disease.   The gastroscope was removed from the patient.  The procedure was completed. The patient tolerated the procedure well.    RECOMMENDATIONS   Advance diet as tolerated.  Imodium 2 mg po every 2 - 4 hours as needed for diarrhea.  Continue iv protonix 40 mg bid.  Ok to switch to po once pt is tolerating po intake.  Monitor  labs - hgb and liver function tests.  Manuel Obrien MD, Gastroenterologist  Cc: Dr. Oliva

## 2024-09-30 NOTE — PROGRESS NOTES
[facility]   part of [facility]     Hospitalist Progress Note     Rambo Kennedycharargentina Patient Status:  Inpatient    3/19/1980 MRN [id number]   Location [facility] 4SW-A Attending Jo Ann Oliva MD   Hosp Day # 1 PCP None Pcp     Chief Complaint: NAKUL,     Subjective:     Patient reports he is feeling better but still has heartburn    Objective:    Review of Systems:   A comprehensive review of systems was completed; pertinent positive and negatives stated in subjective.    Vital signs:  Temp:  [97.9 °F (36.6 °C)-98.8 °F (37.1 °C)] 98.3 °F (36.8 °C)  Pulse:  [] 88  Resp:  [12-28] 22  BP: (104-159)/(45-87) 149/82  SpO2:  [90 %-100 %] 95 %    Physical Exam:    General: No acute distress  Respiratory: No wheezes, no rhonchi  Cardiovascular: S1, S2, regular rate and rhythm  Abdomen: Soft, Non-tender, non-distended, positive bowel sounds  Neuro: No new focal deficits.   Extremities: No edema      Diagnostic Data:    Labs:  Recent Labs   Lab 24  0547 24  1515 24  0029 24  0411 24  0952   WBC 10.4 7.2 6.7 6.5 6.9   HGB 14.2 13.5 13.1 12.8* 13.4   MCV 79.8* 78.5* 78.8* 77.8* 78.7*   .0 144.0* 129.0* 126.0* 124.0*       Recent Labs   Lab 24  1239 24  0547 24  1148 24  0029 24  0411 24  0952   * 323*   < > 268* 286*  286* 330*   BUN 8* 31*   < > 35* 31*  31* 27*   CREATSERUM 0.87 3.29*   < > 1.60* 1.47*  1.47* 1.27   CA 9.1 7.8*   < > 7.4* 7.5*  7.5* 7.9*   ALB 4.4 3.7  --   --  3.3  --     117*   < > 124* 128*  128* 127*   K 3.7 4.0   < > 3.5 3.7  3.7 3.5    74*   < > 90* 93*  93* 93*   CO2 23.0 12.0*   < > 27.0 26.0  26.0 24.0   ALKPHO 88 75  --   --  64  --    * 94*  --   --  102*  --    ALT 69* 77*  --   --  72*  --    BILT 2.4* 2.1*  --   --  1.9*  --    TP 7.2 5.9  --   --  5.2*  --     < > = values in this interval not displayed.       Estimated Creatinine Clearance: 92.6 mL/min (based on SCr of  1.27 mg/dL).    No results for input(s): \"TROP\", \"TROPHS\", \"CK\" in the last 168 hours.    No results for input(s): \"PTP\", \"INR\" in the last 168 hours.               Microbiology    No results found for this visit on 09/29/24.      Imaging: Reviewed in Epic.    Medications:    insulin aspart  4 Units Subcutaneous TID CC    pantoprazole  40 mg Intravenous Q12H    Followed by    [START ON 10/1/2024] pantoprazole  40 mg Oral BID AC    sucralfate  1 g Oral TID AC and HS (2200)    [START ON 10/1/2024] enoxaparin  40 mg Subcutaneous Daily    insulin aspart  2-10 Units Subcutaneous TID AC and HS    [START ON 10/1/2024] insulin degludec  30 Units Subcutaneous Daily    gabapentin  300 mg Oral QAM    gabapentin  600 mg Oral Nightly    thiamine  100 mg Oral Daily    multivitamin with minerals  1 tablet Oral Daily    folic acid  1 mg Oral Daily       Assessment & Plan:      #NAKUL  #Hyponatremia  #Metabolic acidosis due to alcohol ketoacidosis and dehydration  -IVF  -renal on consult  -improving     #Alcohol abuse  #Elevated LFT  -recent 5 day binge  -monitor for withdrawls  -MTV/ FA. Thiamine     #DM type II  -hyperglycemia protocol  -increase basal and correction insulin     #coffee ground emesis  -PPI  -EGD done showing severe esophagitis and diffuse gastritis      Jo Ann Oliva MD    Supplementary Documentation:     Quality:  DVT Mechanical Prophylaxis:   SCDs, Early ambuation  DVT Pharmacologic Prophylaxis   Medication    [START ON 10/1/2024] enoxaparin (Lovenox) 40 MG/0.4ML SUBQ injection 40 mg                Code Status: Full Code  Man: No urinary catheter in place  Man Duration (in days):   Central line:    LILLIAN:     Discharge is dependent on: progress  At this point Mr. Freitas is expected to be discharge to: home    The 21st Century Cures Act makes medical notes like these available to patients in the interest of transparency. Please be advised this is a medical document. Medical documents are intended to carry  relevant information, facts as evident, and the clinical opinion of the practitioner. The medical note is intended as peer to peer communication and may appear blunt or direct. It is written in medical language and may contain abbreviations or verbiage that are unfamiliar.

## 2024-09-30 NOTE — ANESTHESIA POSTPROCEDURE EVALUATION
[facility]    Rambo ORTIZ [place] Patient Status:  Inpatient   Age/Gender 44 year old male MRN [id number]   Location [facility] 4SW-A Attending Jo Ann Oliva MD   Hosp Day # 1 PCP None Pcp       Anesthesia Post-op Note    ESOPHAGOGASTRODUODENOSCOPY (EGD) with biopsies    Procedure Summary       Date: 09/30/24 Room / Location:  ENDOSCOPY 02 /  ENDOSCOPY    Anesthesia Start: 1049 Anesthesia Stop: 1110    Procedure: ESOPHAGOGASTRODUODENOSCOPY (EGD) with biopsies Diagnosis: (Esophagitis, Hiatal Hernia, Gastritis)    Surgeons: Manuel Obrien MD Anesthesiologist: Maximino Artis MD    Anesthesia Type: MAC ASA Status: 2            Anesthesia Type: MAC    Vitals Value Taken Time   /92 09/30/24 1137   Temp   09/30/24 1157   Pulse 83 09/30/24 1140   Resp 18 09/30/24 1135   SpO2 100 % 09/30/24 1140   Vitals shown include unfiled device data.    Patient Location: Endoscopy    Anesthesia Type: MAC    Airway Patency: patent    Postop Pain Control: adequate    Mental Status: preanesthetic baseline    Nausea/Vomiting: none    Cardiopulmonary/Hydration status: stable euvolemic    Complications: no apparent anesthesia related complications    Postop vital signs: stable    Dental Exam: Unchanged from Preop    Patient to be discharged from PACU when criteria met.

## 2024-09-30 NOTE — PROGRESS NOTES
[facility]   part of [facility]     Nephrology Progress Note    Rambo Freitas Patient Status:  Inpatient    3/19/1980 MRN [id number]   Location [facility] 4SW-A Attending Jo Ann Oliva MD   Hosp Day # 1 PCP None Pcp       SUBJECTIVE:  Stable overnight, c/o severe GERD sx        Physical Exam:   /71   Pulse 87   Temp 97.9 °F (36.6 °C) (Temporal)   Resp 20   Wt 243 lb 2.7 oz (110.3 kg)   SpO2 93%   BMI 28.84 kg/m²   Temp (24hrs), Av.3 °F (36.8 °C), Min:97.9 °F (36.6 °C), Max:98.8 °F (37.1 °C)       Intake/Output Summary (Last 24 hours) at 2024 0715  Last data filed at 2024 0629  Gross per 24 hour   Intake 6469.05 ml   Output 600 ml   Net 5869.05 ml     Last 3 Weights   24 0000 243 lb 2.7 oz (110.3 kg)   24 1000 235 lb 7.2 oz (106.8 kg)   24 0918 235 lb 7.2 oz (106.8 kg)   24 1143 230 lb (104.3 kg)   20 1627 202 lb 6.4 oz (91.8 kg)   20 1129 160 lb (72.6 kg)   20 1452 205 lb (93 kg)     General: Alert and oriented in no apparent distress.  HEENT: No scleral icterus, MMM  Neck: Supple, no DELFINO or thyromegaly  Cardiac: Regular rate and rhythm, S1, S2 normal, no murmur or rub  Lungs: Clear without wheezes, rales, rhonchi.    Abdomen: Soft, non-tender. + bowel sounds, no palpable organomegaly  Extremities: Without clubbing, cyanosis or edema.  Neurologic:  moving all extremities  Skin: Warm and dry, no rash        Labs:     Recent Labs   Lab 24  1239 24  0547 24  1515 24  0029 24  0411   WBC 6.2 10.4 7.2 6.7 6.5   HGB 15.7 14.2 13.5 13.1 12.8*   MCV 81.1 79.8* 78.5* 78.8* 77.8*   .0 187.0 144.0* 129.0* 126.0*       Recent Labs   Lab 24  1239 24  0547 24  1148 24  1515 24  1955 24  0029 24  0411    117* 116* 118* 120* 124* 128*  128*   K 3.7 4.0 4.0 4.4 3.5 3.5 3.7  3.7    74* 79* 82* 86* 90* 93*  93*   CO2 23.0 12.0* 22.0 24.0 27.0 27.0 26.0   26.0   BUN 8* 31* 40* 36* 39* 35* 31*  31*   CREATSERUM 0.87 3.29* 3.13* 2.69* 2.09* 1.60* 1.47*  1.47*   CA 9.1 7.8* 7.2* 7.0* 7.2* 7.4* 7.5*  7.5*   MG 2.0 1.8  --   --   --   --  3.2*   * 323* 363* 361* 301* 268* 286*  286*       Recent Labs   Lab 09/25/24  1239 09/29/24  0547 09/30/24  0411   ALT 69* 77* 72*   * 94* 102*   ALB 4.4 3.7 3.3       Recent Labs   Lab 09/29/24  1133 09/29/24  1612 09/29/24  2038   PGLU 349* 331* 269*       Meds:   Current Facility-Administered Medications   Medication Dose Route Frequency    potassium chloride 20 mEq/100mL IVPB premix 20 mEq  20 mEq Intravenous Once    [Held by provider] heparin (Porcine) 5000 UNIT/ML injection 5,000 Units  5,000 Units Subcutaneous Q8H MEGAN    ondansetron (Zofran) 4 MG/2ML injection 4 mg  4 mg Intravenous Q6H PRN    prochlorperazine (Compazine) 10 MG/2ML injection 5 mg  5 mg Intravenous Q8H PRN    alum-mag hydroxide-simethicone (Maalox) 200-200-20 MG/5ML oral suspension 30 mL  30 mL Oral QID PRN    pantoprazole (Protonix) 40 mg in sodium chloride 0.9% PF 10 mL IV push  40 mg Intravenous Q12H    albuterol (Ventolin HFA) 108 (90 Base) MCG/ACT inhaler 2 puff  2 puff Inhalation Q6H PRN    glucose (Dex4) 15 GM/59ML oral liquid 15 g  15 g Oral Q15 Min PRN    Or    glucose (Glutose) 40% oral gel 15 g  15 g Oral Q15 Min PRN    Or    glucose-vitamin C (Dex-4) chewable tab 4 tablet  4 tablet Oral Q15 Min PRN    Or    dextrose 50% injection 50 mL  50 mL Intravenous Q15 Min PRN    Or    glucose (Dex4) 15 GM/59ML oral liquid 30 g  30 g Oral Q15 Min PRN    Or    glucose (Glutose) 40% oral gel 30 g  30 g Oral Q15 Min PRN    Or    glucose-vitamin C (Dex-4) chewable tab 8 tablet  8 tablet Oral Q15 Min PRN    sodium chloride 0.9% infusion   Intravenous Continuous    polyethylene glycol (PEG 3350) (Miralax) 17 g oral packet 17 g  17 g Oral Daily PRN    sennosides (Senokot) tab 17.2 mg  17.2 mg Oral Nightly PRN    bisacodyl (Dulcolax) 10 MG rectal  suppository 10 mg  10 mg Rectal Daily PRN    fleet enema (Fleet) rectal enema 133 mL  1 enema Rectal Once PRN    insulin degludec (Tresiba) 100 units/mL flextouch 16 Units  16 Units Subcutaneous Daily    insulin aspart (NovoLOG) 100 Units/mL FlexPen 1-10 Units  1-10 Units Subcutaneous TID AC and HS    gabapentin (Neurontin) cap 300 mg  300 mg Oral QAM    gabapentin (Neurontin) tab 600 mg  600 mg Oral Nightly    LORazepam (Ativan) tab 1 mg  1 mg Oral Q4H PRN    Or    LORazepam (Ativan) 2 mg/mL injection 1 mg  1 mg Intravenous Q4H PRN    LORazepam (Ativan) tab 2 mg  2 mg Oral Q2H PRN    Or    LORazepam (Ativan) 2 mg/mL injection 2 mg  2 mg Intravenous Q2H PRN    LORazepam (Ativan) tab 3 mg  3 mg Oral Q1H PRN    Or    LORazepam (Ativan) 2 mg/mL injection 3 mg  3 mg Intravenous Q1H PRN    LORazepam (Ativan) 2 mg/mL injection 4 mg  4 mg Intravenous Q30 Min PRN    LORazepam (Ativan) 2 mg/mL injection 5 mg  5 mg Intravenous Q15 Min PRN    LORazepam (Ativan) 2 mg/mL injection 6 mg  6 mg Intravenous Q10 Min PRN    chlordiazePOXIDE (Librium) cap 25 mg  25 mg Oral Q8H    Followed by    [START ON 10/1/2024] chlordiazePOXIDE (Librium) cap 25 mg  25 mg Oral Q12H    Followed by    [START ON 10/2/2024] chlordiazePOXIDE (Librium) cap 25 mg  25 mg Oral Q24H    thiamine (Vitamin B1) tab 100 mg  100 mg Oral Daily    multivitamin with minerals (Thera M Plus) tab 1 tablet  1 tablet Oral Daily    folic acid (Folvite) tab 1 mg  1 mg Oral Daily         Impression/Plan:      #1.  NAKUL- in setting of profound volume depletion with severe N/V. Improved today with aggressive IVF and will back off      #2.   Hyponatremia- suspect due to hypovolemia with component related to hyperglycemia (pseudohypona) as well..  OK to correct reasonably quickly given documented nl na on 9/25. Steadily better with NS and will cont today     #3.  Acidosis- suspect due to NAKUL and well as poss component starvation ketosis.  No evidence of DKA.  resolved     #4.   Etoh use/abuse- CIWA.  Has been stable    #5.  IDDM- per primary service    #6.  GERD- on IV PPI      Questions/concerns were discussed with patient and/or family by bedside.    Cctime 30 minutes      Michelet Muhammad MD  9/30/2024  7:15 AM

## 2024-09-30 NOTE — PLAN OF CARE
Assumed care of patient after shift report. Alert and oriented x4, follows commands. Max CIWA 4. EGD done. Patient still intermittently nauseous. Zofran and compazine x1. Kept on a clear liquid diet, patient tolerating. Ambulates with stand by assist to toilet and ambulated in hallway x2.

## 2024-09-30 NOTE — PHYSICAL THERAPY NOTE
PHYSICAL THERAPY EVALUATION - INPATIENT     Room Number: 459/459-A  Evaluation Date: 9/30/2024  Type of Evaluation: Initial  Physician Order: PT Eval and Treat    Presenting Problem: Hyponatremia, ETOH withdrawal syndrome  Co-Morbidities : Asthma, IDDM, MVP  Reason for Therapy: Mobility Dysfunction and Discharge Planning    PHYSICAL THERAPY ASSESSMENT   Patient is currently functioning below baseline with bed mobility, transfers, gait, stair negotiation, standing prolonged periods, and performing household tasks.  Prior to admission, patient's baseline is independent.  Patient is requiring contact guard assist as a result of the following impairments: pain, impaired standing balance, decreased muscular endurance, dizziness and medical status.     Patient will benefit from continued skilled PT Services with no additional skilled services but increased support at home.    PLAN  PT Treatment Plan: Patient education;Gait training;Neuromuscular re-educate;Transfer training  Rehab Potential : Good  Frequency (Obs): 3-5x/week  Number of Visits to Meet Established Goals: 3      CURRENT GOALS    Goal #1 Patient is able to demonstrate supine - sit EOB @ level: modified independent     Goal #2 Patient is able to demonstrate transfers Sit to/from Stand at assistance level: modified independent     Goal #3 Patient is able to ambulate 150 feet with assist device: none at assistance level: modified independent     Goal #4 The pt will ascend/descend 1 flight of stairs with use of a handrail with SBA.   Goal #5    Goal #6    Goal Comments: Goals established on 9/30/2024      PHYSICAL THERAPY MEDICAL/SOCIAL HISTORY  History related to current admission: Patient is a 44 year old male who presented to the ED on 9/29/2024 from home for intractable vomiting and throat pain following an episode of binge drinking.  Pt diagnosed with hyponatremia and ETOH withdrawal.      HOME SITUATION  Type of Home: House   Home Layout: Multi-level;Able  to live on main level  Stairs to Enter : 2  Railing: No  Stairs to Bedroom: 12 (in the basement)  Railing: Yes    Lives With: Parent(s)  Drives: Yes  Patient Owned Equipment: None  Patient Regularly Uses: Glasses    Prior Level of Golden: The was recently incarcerated and released this month.  Pt states since his parole he has been living in his parents basement.  Pt has returned to work in construction and states he is typically quite active and agile, climbing scaffolding and maneuvering the construction sites with ease.    SUBJECTIVE  Pt c/o dizziness with mobility and abdominal pain.      OBJECTIVE  Precautions: Seizure (L ankle monitor)  Fall Risk: High fall risk    WEIGHT BEARING RESTRICTION  Weight Bearing Restriction: None                PAIN ASSESSMENT  Ratin  Location: throat and lower abdomen  Management Techniques: Relaxation;Repositioning    COGNITION  Overall Cognitive Status:  WFL - within functional limits  Orientation Level:  oriented x4  Memory:  appears intact  Following Commands:  follows all commands and directions without difficulty    RANGE OF MOTION AND STRENGTH ASSESSMENT  Upper extremity ROM and strength are within functional limits     Lower extremity ROM is within functional limits     Lower extremity strength is within functional limits, grossly 5/5      BALANCE  Static Sitting: Good  Dynamic Sitting: Fair +  Static Standing: Good  Dynamic Standing: Poor +    ADDITIONAL TESTS                Occulomotor & Vestibulo-Ocular Exam:  Spontaneous Nystagmus: Negative  Gaze Evoked Nystagmus: R, L  Smooth Pursuit: R , L  Saccades:   Convergence:  R Negative, L Negative  Cover/Uncover: R Negative, L Negative    Modified Robles Balance Test=24/28    1. Sitting to standing                                                                   4  2. Standing unsupported with eyes closed                                 2  3. Reaching forward with outstretched arm while standing        4  4.   object from the floor from a standing position            4  5. Turning to look over left and right shoulders while standing   4  6. Standing unsupported one foot in front                                   2  7. Standing on one leg                                                                4      ACTIVITY TOLERANCE  Pulse: 96  Heart Rate Source: Monitor  Resp: 16                O2 WALK  Oxygen Therapy  SPO2% on Room Air at Rest: 93    NEUROLOGICAL FINDINGS                        AM-PAC '6-Clicks' INPATIENT SHORT FORM - BASIC MOBILITY  How much difficulty does the patient currently have...  Patient Difficulty: Turning over in bed (including adjusting bedclothes, sheets and blankets)?: None   Patient Difficulty: Sitting down on and standing up from a chair with arms (e.g., wheelchair, bedside commode, etc.): A Little   Patient Difficulty: Moving from lying on back to sitting on the side of the bed?: None   How much help from another person does the patient currently need...   Help from Another: Moving to and from a bed to a chair (including a wheelchair)?: A Little   Help from Another: Need to walk in hospital room?: A Little   Help from Another: Climbing 3-5 steps with a railing?: A Little       AM-PAC Score:  Raw Score: 20   Approx Degree of Impairment: 35.83%   Standardized Score (AM-PAC Scale): 47.67   CMS Modifier (G-Code): CJ    FUNCTIONAL ABILITY STATUS  Gait Assessment   Functional Mobility/Gait Assessment  Gait Assistance: Contact guard assist  Distance (ft): 200  Assistive Device: None  Pattern: Shuffle;R Foot flat;L Foot flat (arms in low guard)    Skilled Therapy Provided     Bed Mobility:  Rolling: SBA  Supine to sit: SBA   Sit to supine: SBA     Transfer Mobility:  Sit to stand: SBA   Stand to sit: SBA  Gait = CGA    Therapist's Comments:  Pt ambulates with decreased gait speed, mildly increased sway and arm held in low guard with absent arm swing and significantly reduced trunk  rotation.    Exercise/Education Provided:  Bed mobility  Functional activity tolerated  Gait training  Neuromuscular re-educate  Posture  Strengthening  Transfer training    Patient End of Session: In bed;Needs met;Call light within reach;RN aware of session/findings;All patient questions and concerns addressed      Patient Evaluation Complexity Level:  History High - 3 or more personal factors and/or co-morbidities   Examination of body systems Moderate - addressing a total of 3 or more elements   Clinical Presentation  Moderate - Evolving   Clinical Decision Making Moderate Complexity       PT Session Time: 25 minutes  Gait Training: 10 minutes

## 2024-09-30 NOTE — OCCUPATIONAL THERAPY NOTE
OCCUPATIONAL THERAPY EVALUATION - INPATIENT     Room Number: 459/459-A  Evaluation Date: 9/30/2024  Type of Evaluation: Initial  Presenting Problem: hyponatremia    Physician Order: IP Consult to Occupational Therapy  Reason for Therapy: ADL/IADL Dysfunction and Discharge Planning    OCCUPATIONAL THERAPY ASSESSMENT   Patient is currently functioning near baseline with toileting, lower body dressing, transfers, static standing balance, dynamic standing balance, functional standing tolerance, and performing household tasks. Prior to admission, patient's baseline is independent without device, working construction, climbing scaffolding, etc.  Patient is requiring minimum assistance as a result of the following impairments: pain, impaired standing balance, and medical status. Occupational Therapy will continue to follow for duration of hospitalization.    Patient will benefit from continued skilled OT Services for duration of hospitalization, however, given the patient is functioning near baseline level do not anticipate skilled therapy needs at discharge       History Related to Current Admission: Patient is a 44 year old male admitted on 9/29/2024 with Presenting Problem: hyponatremia. Co-Morbidities : asthma, DM, chronic back pain, etoh abuse    Admitted from home with intractable vomiting , dx with hyponatremia    WEIGHT BEARING RESTRICTION  Weight Bearing Restriction: None                Recommendations for nursing staff:   Transfers: 1 person, gait belt  Toileting location: bathroom  ( recommend commode frame to elevate toilet )    EVALUATION SESSION:  Patient Start of Session: supine  FUNCTIONAL TRANSFER ASSESSMENT  Sit to Stand: Edge of Bed  Edge of Bed: Stand-by Assist    BED MOBILITY  Supine to Sit : Independent  Sit to Supine (OT): Independent  Scooting: independent    BALANCE ASSESSMENT  Static Sitting: Independent  Sitting Bilateral: Supervision  Static Standing: Stand-by Assist  Standing Bilateral: Contact  Guard Assist    FUNCTIONAL ADL ASSESSMENT  LB Dressing Seated: Independent      ACTIVITY TOLERANCE: stable on room air           BP: 132/73  BP Location: Right arm  BP Method: Automatic  Patient Position: Sitting    O2 SATURATIONS  Oxygen Therapy  SPO2% on Room Air at Rest: 94    COGNITION  Overall Cognitive Status:  WFL - within functional limits and able to make needs known and carry conversation, anticipate needs/problems, oriented x 4  Reported that he doesn't feel 100%    Upper Extremity   ROM: within functional limits   Strength: within functional limits 5/5  Coordination  Gross motor: wfl  Fine motor: wfl  Sensation:  denied UE deficits    EDUCATION PROVIDED  Patient: Role of Occupational Therapy; Plan of Care  Patient's Response to Education: Verbalized Understanding    Equipment used:  gait belt  Demonstrates functional use, Would benefit from additional trial yes     Therapist comments: Able to transfer with SUP to IND and ambulate with CGA, no device. Slightly unsteady. Patient able to doff/don socks using hip/knee flexion while seated at edge of bed without difficulty. Pre-occupied with upcoming GI test.    Patient End of Session: All patient questions and concerns addressed;RN aware of session/findings;Call light within reach;Needs met;In bed;Alarm set    OCCUPATIONAL PROFILE    HOME SITUATION  Type of Home: House  Home Layout: Multi-level;Able to live on main level (bedroom in basement)  Lives With: Friend(s)    Toilet and Equipment: Comfort height toilet  Shower/Tub and Equipment: Walk-in shower;Tub-shower combo;Grab bar       Occupation/Status: construction     Drives: Yes  Patient Regularly Uses: Glasses    Prior Level of Function: Independent with all mobility and ADLs, IADLs. Works in construction. Drives. Owns his own home but is currently staying at parents' house as he was recently released from being incarcerated.    SUBJECTIVE   Pleasant and appreciative    PAIN ASSESSMENT  Rating: Unable to  rate  Location: stomach/esophagus  Management Techniques: Other (Comment) (nurse aware)    OBJECTIVE  Precautions: Bed/chair alarm;Seizure (L ankle monitor)  Fall Risk: High fall risk      ASSESSMENTS    AM-PAC ‘6-Clicks’ Inpatient Daily Activity Short Form  -   Putting on and taking off regular lower body clothing?: A Little  -   Bathing (including washing, rinsing, drying)?: A Little  -   Toileting, which includes using toilet, bedpan or urinal? : A Little  -   Putting on and taking off regular upper body clothing?: None  -   Taking care of personal grooming such as brushing teeth?: None  -   Eating meals?: None    AM-PAC Score:  Score: 21  Approx Degree of Impairment: 32.79%  Standardized Score (AM-PAC Scale): 44.27    ADDITIONAL TESTS     NEUROLOGICAL FINDINGS      COGNITION ASSESSMENTS       PLAN  OT Treatment Plan: ADL training;Energy conservation/work simplification techniques;Functional transfer training;Endurance training;Equipment eval/education;Patient/Family training;Patient/Family education;Compensatory technique education  Rehab Potential : Good  Frequency: 3-5x/week  Number of Visits to Meet Established Goals: 3    ADL Goals   Patient will perform grooming: with supervision and while standing at sink  Patient will perform lower body dressing:  with supervision  Patient will perform toileting: with supervision    Functional Transfer Goals  Patient will transfer to toilet:  with supervision    UE Exercise Program Goal  Patient will be supervision with bilateral AROM HEP (home exercise program).    Therapist Goals  PHQ9 screening    Patient Evaluation Complexity Level:   Occupational Profile/Medical History LOW - Brief history including review of medical or therapy records    Specific performance deficits impacting engagement in ADL/IADL LOW  1 - 3 performance deficits    Client Assessment/Performance Deficits MODERATE - Comorbidities and min to mod modifications of tasks    Clinical Decision Making LOW  - Analysis of occupational profile, problem-focused assessments, limited treatment options    Overall Complexity LOW     OT Session Time: 18 minutes    Therapeutic Activity: 15 minutes

## 2024-09-30 NOTE — PROGRESS NOTES
Patient was stable overnight with no acute overnight events.  Still having reflux symptoms.  Went for EGD today EGD demonstrated signs of esophagitis and gastritis and some ulceration of the distal 15 cm of esophagus.    His labs this morning where showing hyperglycemia, he had a sodium of 127 potassium 3.5 chloride of 93 bicarb of 24.  His AST was 102 which is stable to downtrending overall.  ALT was 72 which is stable.  Bilirubin was mildly elevated at 1.9 which is downtrending from admission.    His hemoglobin was 13 his white blood cell count was 7 and his platelet count was 124.    In summary this is a 44-year-old male the presents to us with alcohol withdrawal metabolic acidosis and acute kidney injury with hyponatremia    Problems    Alcohol withdrawal  Insulin-dependent diabetes poorly controlled  Mitral valve prolapse  Asthma  GI bleed  Gastric and esophageal ulceration  Hyponatremia  Acute kidney injury and acute renal failure    Plan    Neurologically    The patient is awake alert oriented no signs of alcohol withdrawal at this time no tremors  I have discontinued the the Librium taper  He can be on gabapentin  Can consider using as needed doses of benzodiazepines or phenobarbital as needed    Cardiovascular  Patient is not hypotensive  Blood pressure seems robust heart rate is controlled no signs of fib at this time    Respiratory no active respiratory issues  Has a history of asthma can use albuterol inhaler as needed    GI  No stamper tonics IV until he is able to take it p.o.  Just has significant ulcerations and will continue sucralfate as well    Severe abstinence from alcohol will be needed    Renal  Creatinine has improved baseline creatinine is close to normal at 0.8 currently is down to 1.2 and improving from 3.2 on admission.  BUN is minimally elevated.  Urine output has been excellent although not strictly manage denies knows he has been urinating and voiding on his  own.    Endo  Hyperglycemia  The patient's sugars have been poorly controlled  My plan is to increase his Tresiba to 20 units  He needs mealtime insulin I started with 4 units 3 times daily this may need to be increased  I gave him a medium dose sliding scale.  The doses of NPH that he was on seem unlikely to control his blood sugars that he was on previously.  Is not clear if this how well this was controlled in the past his hemoglobin A1c this admission was almost 9%.  He clearly needs mealtime insulin as well as long-acting insulin for better management    Tubes lines and drains  Peripheral IVs  No Magda  Is tolerating a diet    Pending blood sugar control can transfer out of the ICU to a medical floor today    Critical care attending    36 minutes of critical care time were spent at the bedside performing history, physical, complex data interpretation, radiographic interpretation, discussion with consultants, and creating a diagnostic and therapeutic treatment plan for this critically ill patient. Time spent was excluding time on procedures and resident teaching.    Date of Service  September 30, 2024      Patient was taking 32 units of lantus at home, plus 4 units around meals and sliding scale insulin per patient.    Poor glucose control here despite him being NPO.     Will increase treisba dose to 30U tomorrow and will place him on medium dose sliding scale as well as 4 units TID with meals.    Updated patient he needs to see endo as an outpt and will f/u suagrs in the hospital today and tomorrow.

## 2024-09-30 NOTE — BH PROGRESS NOTE
Writer met with patient at bedside. Patient stated that he did not want referrals at this time. Patient stated that he would like to start Antabuse to help with the alcohol. Patient denied wanting any additional assistance at this time. Patient did state at the end of the visit that he did want referrals but did continue to deny the assessment.    Referrals placed in patient's chart.

## 2024-09-30 NOTE — CONSULTS
GASTROENTEROLOGY CONSULTATION  Manuel Obrien MD    Department of Gastroenterology  [facility]    Rambo Freitas Patient Status:  Inpatient    3/19/1980 MRN [id number]   [address] Attending Jo Ann Oliva MD   Hosp Day # 1 PCP None Pcp     Reason for Consultation:  Coffee ground emesis  Abnormal liver function tests     History of Present Illness:  Rambo Freitas is a a(n) 44 year old male with IDDM, chronic GERD and prior hx of alcohol abuse - consult requested for evaluation of coffee ground emesis and elevated liver function tests.  Pt had 5 days alcohol ingestion binge.  He states that he has a prior hx of alcohol abuse but had not ingested alcohol for > 4 years.   Pt admitted with recurrent nausea and vomiting with retching with vomiting.  He takes omeprazole daily with incomplete control of GERD.  Occasional sensation of solid food dysphagia without food impactions.     Intermittent chronic diarrhea, which pt attributes to side effect from PPIs.   He denies use of nsaids and does not smoke tobacco.  He is a .   Pt seen by GI MD (Dr. Cameron) at [facility] in 2019 for elevated liver function tests with hx of alcohol abuse - liver work up was negative with hepatic steatosis on US liver.   Labs on admission include Na 117, corrected to 128.  Creat 3.29 corrected to 1.47.  LFTs include normal alk phos, tbili 1.9, , ALT 72  Hgb 12.8 - normocytic, plts 126.    History:  Past Medical History:    Asthma (HCC)    Back pain    2 bulging disks    Bilateral hearing loss, unspecified hearing loss type    Exercise-induced asthma (HCC)    Mitral valve prolapse    has been checked for heart problems and arrythmia at [place] (about )    Rib fracture    left side    Sleep walking    Type 1 diabetes mellitus (HCC)     Past Surgical History:   Procedure Laterality Date    Tonsillectomy  age 17     Family History   Problem Relation Age of Onset    Breast Cancer Mother      Heart Disease Father         pacemaker    Heart Disease Sister         pacemaker     Heart Disease Paternal Aunt         pacemaker    Heart Disease Paternal Aunt         pacemaker      reports that he has quit smoking. His smoking use included cigarettes. He has a 10 pack-year smoking history. He has never used smokeless tobacco. He reports that he does not currently use alcohol after a past usage of about 3.0 standard drinks of alcohol per week. He reports that he does not use drugs.    Allergies:  No Known Allergies    Medications:    Current Facility-Administered Medications:     [Held by provider] heparin (Porcine) 5000 UNIT/ML injection 5,000 Units, 5,000 Units, Subcutaneous, Q8H MEGAN    ondansetron (Zofran) 4 MG/2ML injection 4 mg, 4 mg, Intravenous, Q6H PRN    prochlorperazine (Compazine) 10 MG/2ML injection 5 mg, 5 mg, Intravenous, Q8H PRN    alum-mag hydroxide-simethicone (Maalox) 200-200-20 MG/5ML oral suspension 30 mL, 30 mL, Oral, QID PRN    pantoprazole (Protonix) 40 mg in sodium chloride 0.9% PF 10 mL IV push, 40 mg, Intravenous, Q12H    albuterol (Ventolin HFA) 108 (90 Base) MCG/ACT inhaler 2 puff, 2 puff, Inhalation, Q6H PRN    glucose (Dex4) 15 GM/59ML oral liquid 15 g, 15 g, Oral, Q15 Min PRN **OR** glucose (Glutose) 40% oral gel 15 g, 15 g, Oral, Q15 Min PRN **OR** glucose-vitamin C (Dex-4) chewable tab 4 tablet, 4 tablet, Oral, Q15 Min PRN **OR** dextrose 50% injection 50 mL, 50 mL, Intravenous, Q15 Min PRN **OR** glucose (Dex4) 15 GM/59ML oral liquid 30 g, 30 g, Oral, Q15 Min PRN **OR** glucose (Glutose) 40% oral gel 30 g, 30 g, Oral, Q15 Min PRN **OR** glucose-vitamin C (Dex-4) chewable tab 8 tablet, 8 tablet, Oral, Q15 Min PRN    sodium chloride 0.9% infusion, , Intravenous, Continuous    polyethylene glycol (PEG 3350) (Miralax) 17 g oral packet 17 g, 17 g, Oral, Daily PRN    sennosides (Senokot) tab 17.2 mg, 17.2 mg, Oral, Nightly PRN    bisacodyl (Dulcolax) 10 MG rectal suppository 10  mg, 10 mg, Rectal, Daily PRN    fleet enema (Fleet) rectal enema 133 mL, 1 enema, Rectal, Once PRN    insulin degludec (Tresiba) 100 units/mL flextouch 16 Units, 16 Units, Subcutaneous, Daily    insulin aspart (NovoLOG) 100 Units/mL FlexPen 1-10 Units, 1-10 Units, Subcutaneous, TID AC and HS    gabapentin (Neurontin) cap 300 mg, 300 mg, Oral, QAM    gabapentin (Neurontin) tab 600 mg, 600 mg, Oral, Nightly    LORazepam (Ativan) tab 1 mg, 1 mg, Oral, Q4H PRN **OR** LORazepam (Ativan) 2 mg/mL injection 1 mg, 1 mg, Intravenous, Q4H PRN    LORazepam (Ativan) tab 2 mg, 2 mg, Oral, Q2H PRN **OR** LORazepam (Ativan) 2 mg/mL injection 2 mg, 2 mg, Intravenous, Q2H PRN    LORazepam (Ativan) tab 3 mg, 3 mg, Oral, Q1H PRN **OR** LORazepam (Ativan) 2 mg/mL injection 3 mg, 3 mg, Intravenous, Q1H PRN    LORazepam (Ativan) 2 mg/mL injection 4 mg, 4 mg, Intravenous, Q30 Min PRN    LORazepam (Ativan) 2 mg/mL injection 5 mg, 5 mg, Intravenous, Q15 Min PRN    LORazepam (Ativan) 2 mg/mL injection 6 mg, 6 mg, Intravenous, Q10 Min PRN    [COMPLETED] chlordiazePOXIDE (Librium) cap 25 mg, 25 mg, Oral, Q6H **FOLLOWED BY** chlordiazePOXIDE (Librium) cap 25 mg, 25 mg, Oral, Q8H **FOLLOWED BY** [START ON 10/1/2024] chlordiazePOXIDE (Librium) cap 25 mg, 25 mg, Oral, Q12H **FOLLOWED BY** [START ON 10/2/2024] chlordiazePOXIDE (Librium) cap 25 mg, 25 mg, Oral, Q24H    thiamine (Vitamin B1) tab 100 mg, 100 mg, Oral, Daily    multivitamin with minerals (Thera M Plus) tab 1 tablet, 1 tablet, Oral, Daily    folic acid (Folvite) tab 1 mg, 1 mg, Oral, Daily    Review of Systems:  Gastrointestinal: See above  General: Denies fatigue, chills/fever, night sweats, weight loss, loss of appetite, weight gain, sleep disturbance.  Cardiovascular: Denies history of heart murmur, chest pain or angina.  Respiratory: Denies shortness of breath, chronic/frequent hoarseness, wheezing, chronic cough, cough up sputum.  Genitourinary: Denies kidney stones,  painful/difficult urination, frequent urinary infections, frequent urination, blood in urine, incontinence, kidney failure.  Psychosocial: noncontributory  Neuro: no tremor, dysarthria, gait disturbance  Skin: Denies severe itching, unusual moles, rash, flushing, change in hair or nails.  Bone/joint: No joint pain or inflammation  Heme/Lymphatic: Denies easy bruising, anemia, excessive bleeding, enlarging or painful lymph nodes.  Allergy: Denies medication allergy, latex/rubber allergy, anaphylactic or other reaction to anesthesia, food allergy.   Eyes: Denies blurred/double vision, eye disease, glasses or contacts, glaucoma.  ENT: Denies nose or gums bleeding, mouth sores, bad breath or bad taste in mouth, hearing loss.  Physical Exam:    Blood pressure 132/73, pulse 96, temperature 98.4 °F (36.9 °C), temperature source Temporal, resp. rate 16, weight 243 lb 2.7 oz (110.3 kg), SpO2 92%.    General: Appears alert, oriented x3 and in no acute distress.  HEENT: Normal. No neck vein distention. Thyroid not enlarged.  No lymphadenopathy.  CV: S1 and S2 normal.  No murmurs or gallops.  Lungs: Clear to auscultation.  Abdomen: Soft and nondistended.  Nontender.  No masses.  Bowel sounds are present.  Back: No CVA tenderness.  Extremities: No edema, cyanosis, or clubbing.  Skin: Warm and dry.  Rectal: deferred  Laboratory Data:  Lab Results   Component Value Date    WBC 6.5 09/30/2024    HGB 12.8 09/30/2024    HCT 34.3 09/30/2024    .0 09/30/2024    CREATSERUM 1.47 09/30/2024    CREATSERUM 1.47 09/30/2024    BUN 31 09/30/2024    BUN 31 09/30/2024     09/30/2024     09/30/2024    K 3.7 09/30/2024    K 3.7 09/30/2024    CL 93 09/30/2024    CL 93 09/30/2024    CO2 26.0 09/30/2024    CO2 26.0 09/30/2024     09/30/2024     09/30/2024    CA 7.5 09/30/2024    CA 7.5 09/30/2024    ALB 3.3 09/30/2024    ALKPHO 64 09/30/2024    BILT 1.9 09/30/2024    TP 5.2 09/30/2024     09/30/2024    ALT 72  09/30/2024    MG 3.2 09/30/2024    PGLU 254 09/30/2024     Component      Latest Ref Rng 12/10/2019   HEP A AB, IGM      Negative  Negative    HBSAg Screen      Negative  Negative    HEP B CORE AB, IGM      Negative  Negative    Hep C Virus Ab      0.0 - 0.9 s/co ratio <0.1    FERRITIN      30.0 - 400.0 ng/mL 228.0    MITOCHONDRIAL (M2) ANTIBODY      0.0 - 20.0 Units <20.0    ACTIN (SMOOTH MUSCLE) ANTIBODY      0 - 19 Units 8    LUIS SCREEN      Negative  Negative    Alpha 1 Antitrypsin      90 - 200 mg/dL 144    Ceruloplasmin      16.0 - 31.0 mg/dL 19.5    Vitamin B12      211 - 946 pg/mL 822    FOLATE (FOLIC ACID), SERUM      4.78 - 24.20 ng/mL 17.73          Assessment:     Coffee ground emesis  Chronic GERD  Abnormal liver function tests  Chronic diarrhea   Pt admitted with recurrent nausea and vomiting and upper abdominal pain after a 5 day alcohol binge.  Prior hx of alcohol abuse.  Pt states no alcohol use for > 4 years prior to this last week.   Several bouts of coffee ground emesis with mildly decreased hgb.  Will assess for PUD, GERD / esophagitis, Dina Cavazos tear, gastroesophageal malignancy.  Occasional bouts of solid food dysphagia.   LFTs mildly elevated in pattern consistent with alcohol related hepatitis.  Prior GI work up for elevated liver function tests in 2019 was negative for other chronic / acute liver diseases.   Chronic intermittent diarrhea - c diff negative.  Will check for celiac disease.  Plan:  1. EGD with duodenal biopsies, possible dilation with MAC today.  2. Continue pantoprazole 40 mg IV bid.  3. NPO.  4. Monitor labs - continue IV hydration and electrolyte correction.    Cc: Dr. Oliva      Thank you for allowing to participate in the care of this patient.    Manuel Obrien MD  9/30/2024  9:38 AM

## 2024-10-01 PROBLEM — E87.20 ACIDOSIS: Status: ACTIVE | Noted: 2024-09-29

## 2024-10-01 PROBLEM — E10.9 TYPE 1 DIABETES MELLITUS WITHOUT COMPLICATION (HCC): Status: ACTIVE | Noted: 2024-10-01

## 2024-10-01 LAB
ANION GAP SERPL CALC-SCNC: 6 MMOL/L (ref 0–18)
BUN BLD-MCNC: 13 MG/DL (ref 9–23)
CALCIUM BLD-MCNC: 8.2 MG/DL (ref 8.7–10.4)
CHLORIDE SERPL-SCNC: 97 MMOL/L (ref 98–112)
CO2 SERPL-SCNC: 32 MMOL/L (ref 21–32)
CREAT BLD-MCNC: 0.96 MG/DL
EGFRCR SERPLBLD CKD-EPI 2021: 99 ML/MIN/1.73M2 (ref 60–?)
ERYTHROCYTE [DISTWIDTH] IN BLOOD BY AUTOMATED COUNT: 12.7 %
GLUCOSE BLD-MCNC: 231 MG/DL (ref 70–99)
GLUCOSE BLD-MCNC: 245 MG/DL (ref 70–99)
GLUCOSE BLD-MCNC: 248 MG/DL (ref 70–99)
GLUCOSE BLD-MCNC: 253 MG/DL (ref 70–99)
GLUCOSE BLD-MCNC: 266 MG/DL (ref 70–99)
HCT VFR BLD AUTO: 35.9 %
HGB BLD-MCNC: 12.7 G/DL
MAGNESIUM SERPL-MCNC: 2.7 MG/DL (ref 1.6–2.6)
MCH RBC QN AUTO: 28.5 PG (ref 26–34)
MCHC RBC AUTO-ENTMCNC: 35.4 G/DL (ref 31–37)
MCV RBC AUTO: 80.5 FL
OSMOLALITY SERPL CALC.SUM OF ELEC: 289 MOSM/KG (ref 275–295)
PHOSPHATE SERPL-MCNC: 1.8 MG/DL (ref 2.4–5.1)
PLATELET # BLD AUTO: 108 10(3)UL (ref 150–450)
POTASSIUM SERPL-SCNC: 4 MMOL/L (ref 3.5–5.1)
POTASSIUM SERPL-SCNC: 4 MMOL/L (ref 3.5–5.1)
RBC # BLD AUTO: 4.46 X10(6)UL
SODIUM SERPL-SCNC: 135 MMOL/L (ref 136–145)
WBC # BLD AUTO: 5.1 X10(3) UL (ref 4–11)

## 2024-10-01 PROCEDURE — 99232 SBSQ HOSP IP/OBS MODERATE 35: CPT | Performed by: INTERNAL MEDICINE

## 2024-10-01 PROCEDURE — 99233 SBSQ HOSP IP/OBS HIGH 50: CPT | Performed by: EMERGENCY MEDICINE

## 2024-10-01 PROCEDURE — 99232 SBSQ HOSP IP/OBS MODERATE 35: CPT | Performed by: HOSPITALIST

## 2024-10-01 PROCEDURE — 99222 1ST HOSP IP/OBS MODERATE 55: CPT | Performed by: CLINICAL NURSE SPECIALIST

## 2024-10-01 RX ORDER — MAGNESIUM SULFATE HEPTAHYDRATE 40 MG/ML
2 INJECTION, SOLUTION INTRAVENOUS ONCE
Status: DISCONTINUED | OUTPATIENT
Start: 2024-10-01 | End: 2024-10-01

## 2024-10-01 NOTE — PLAN OF CARE
Patient alert and orientated x4. With complaints of pain to head and indigestion. Patient with complaints of shortness of breath. CIWA assessments completed. Diet advanced as tolerated.

## 2024-10-01 NOTE — PROGRESS NOTES
[facility]  Progress Note    Rambo Freitas Patient Status:  Inpatient    3/19/1980 MRN [id number]   [facility] 4SW-A Attending Jo Ann Oliva MD   Hosp Day # 2 PCP None Pcp     Subjective:  No N/V - some upper abdominal discomfort attributable to musculoskeletal source from recurrent vomiting.      Current Facility-Administered Medications:     insulin aspart (NovoLOG) 100 Units/mL FlexPen 1-68 Units, 1-68 Units, Subcutaneous, TID AC&HS    insulin aspart (NovoLOG) 100 Units/mL FlexPen 1-20 Units, 1-20 Units, Subcutaneous, TID CC and HS    sodium phosphate 15 mmol in 0.9% NaCl 100mL IVPB premix, 15 mmol, Intravenous, Once    loperamide (Imodium) cap 2 mg, 2 mg, Oral, QID PRN    [COMPLETED] pantoprazole (Protonix) 40 mg in sodium chloride 0.9% PF 10 mL IV push, 40 mg, Intravenous, Q12H **FOLLOWED BY** pantoprazole (Protonix) DR tab 40 mg, 40 mg, Oral, BID AC    sucralfate (Carafate) 1 GM/10ML oral suspension 1 g, 1 g, Oral, TID AC and HS (2200)    enoxaparin (Lovenox) 40 MG/0.4ML SUBQ injection 40 mg, 40 mg, Subcutaneous, Daily    insulin degludec (Tresiba) 100 units/mL flextouch 25 Units, 25 Units, Subcutaneous, Daily    ondansetron (Zofran) 4 MG/2ML injection 4 mg, 4 mg, Intravenous, Q6H PRN    prochlorperazine (Compazine) 10 MG/2ML injection 5 mg, 5 mg, Intravenous, Q8H PRN    alum-mag hydroxide-simethicone (Maalox) 200-200-20 MG/5ML oral suspension 30 mL, 30 mL, Oral, QID PRN    albuterol (Ventolin HFA) 108 (90 Base) MCG/ACT inhaler 2 puff, 2 puff, Inhalation, Q6H PRN    glucose (Dex4) 15 GM/59ML oral liquid 15 g, 15 g, Oral, Q15 Min PRN **OR** glucose (Glutose) 40% oral gel 15 g, 15 g, Oral, Q15 Min PRN **OR** glucose-vitamin C (Dex-4) chewable tab 4 tablet, 4 tablet, Oral, Q15 Min PRN **OR** dextrose 50% injection 50 mL, 50 mL, Intravenous, Q15 Min PRN **OR** glucose (Dex4) 15 GM/59ML oral liquid 30 g, 30 g, Oral, Q15 Min PRN **OR** glucose (Glutose) 40% oral gel 30 g, 30 g, Oral, Q15  Min PRN **OR** glucose-vitamin C (Dex-4) chewable tab 8 tablet, 8 tablet, Oral, Q15 Min PRN    polyethylene glycol (PEG 3350) (Miralax) 17 g oral packet 17 g, 17 g, Oral, Daily PRN    sennosides (Senokot) tab 17.2 mg, 17.2 mg, Oral, Nightly PRN    bisacodyl (Dulcolax) 10 MG rectal suppository 10 mg, 10 mg, Rectal, Daily PRN    fleet enema (Fleet) rectal enema 133 mL, 1 enema, Rectal, Once PRN    gabapentin (Neurontin) cap 300 mg, 300 mg, Oral, QAM    gabapentin (Neurontin) tab 600 mg, 600 mg, Oral, Nightly    thiamine (Vitamin B1) tab 100 mg, 100 mg, Oral, Daily    multivitamin with minerals (Thera M Plus) tab 1 tablet, 1 tablet, Oral, Daily    folic acid (Folvite) tab 1 mg, 1 mg, Oral, Daily    Past Medical History:    Asthma (HCC)    Back pain    2 bulging disks    Bilateral hearing loss, unspecified hearing loss type    Exercise-induced asthma (HCC)    Mitral valve prolapse    has been checked for heart problems and arrythmia at [place] (about 2013)    Rib fracture    left side    Sleep walking    Type 1 diabetes mellitus (HCC)     Past Surgical History:   Procedure Laterality Date    Tonsillectomy  age 17         Objective:  Blood pressure 155/74, pulse 85, temperature 98.9 °F (37.2 °C), temperature source Temporal, resp. rate 15, weight 244 lb 11.4 oz (111 kg), SpO2 96%.      EXAM:  /74 (BP Location: Right arm)   Pulse 85   Temp 98.9 °F (37.2 °C) (Temporal)   Resp 15   Wt 244 lb 11.4 oz (111 kg)   SpO2 96%   BMI 29.02 kg/m²   GENERAL: well developed, well nourished, in no apparent distress  HEENT: atraumatic, normocephalic  CHEST: no chest tenderness  LUNGS: clear to auscultation  CARDIO: RRR without murmur  GI: good BS's and no masses, HSM or tenderness  EXTREMITIES: no cyanosis, clubbing or edema      Labs:   Lab Results   Component Value Date    WBC 5.1 10/01/2024    HGB 12.7 10/01/2024    HCT 35.9 10/01/2024    .0 10/01/2024    CREATSERUM 0.96 10/01/2024    BUN 13 10/01/2024      10/01/2024    K 4.0 10/01/2024    K 4.0 10/01/2024    CL 97 10/01/2024    CO2 32.0 10/01/2024     10/01/2024    CA 8.2 10/01/2024    MG 2.7 10/01/2024    PHOS 1.8 10/01/2024    PGLU 231 10/01/2024       Assessment:     Esophagitis  GERD  Alcoholic hepatitis  Diarrhea   No further diarrhea with imodium prn.   Heartburn, N/V and abdominal pain controlled with pantoprazole.  Pt describes a hx of Byers's esophagitis on 2005 EGD.   Hgb stable.     Biopsies from the EGD are pending.  Plan:  1. Continue protonix 40 mg bid for 4 weeks, then resume 40 mg once per day for maintenance therapy for GERD.  Will stop carafate - of marginal benefit.  2. Continue general diet.  3. Repeat EGD in 8 weeks to assess for Byers's esophagitis.  4. Screening colonoscopy at the time of the follow EGD as the pt is at average risk for colon cancer given age > 45.  5. Alcohol abstinence.      Manuel Obrien MD  10/1/2024  10:39 AM

## 2024-10-01 NOTE — PROGRESS NOTES
[facility]   part of [facility]     Hospitalist Progress Note     Rambo Kennedycharargentina Patient Status:  Inpatient    3/19/1980 MRN [id number]   Location [facility] 4SW-A Attending Jo Ann Oliva MD   Hosp Day # 2 PCP None Pcp     Chief Complaint: NAKUL,     Subjective:     No acute overnight events    Objective:    Review of Systems:   A comprehensive review of systems was completed; pertinent positive and negatives stated in subjective.    Vital signs:  Temp:  [98 °F (36.7 °C)-98.9 °F (37.2 °C)] 98.2 °F (36.8 °C)  Pulse:  [73-98] 85  Resp:  [12-25] 25  BP: (131-155)/(71-98) 145/80  SpO2:  [93 %-97 %] 97 %    Physical Exam:    General: No acute distress  Respiratory: No wheezes, no rhonchi  Cardiovascular: S1, S2, regular rate and rhythm  Abdomen: Soft, Non-tender, non-distended, positive bowel sounds  Neuro: No new focal deficits.   Extremities: No edema      Diagnostic Data:    Labs:  Recent Labs   Lab 24  0029 24  0411 24  0952 24  1427 10/01/24  0433   WBC 6.7 6.5 6.9 5.1 5.1   HGB 13.1 12.8* 13.4 12.6* 12.7*   MCV 78.8* 77.8* 78.7* 76.2* 80.5   .0* 126.0* 124.0* 119.0* 108.0*       Recent Labs   Lab 24  1239 24  0547 24  1148 24  0411 24  0952 24  1427 09/30/24  2027 10/01/24  0433   * 323*   < > 286*  286*   < > 215* 224* 253*   BUN 8* 31*   < > 31*  31*   < > 21 15 13   CREATSERUM 0.87 3.29*   < > 1.47*  1.47*   < > 1.11 0.92 0.96   CA 9.1 7.8*   < > 7.5*  7.5*   < > 7.8* 7.8* 8.2*   ALB 4.4 3.7  --  3.3  --   --   --   --     117*   < > 128*  128*   < > 132* 132* 135*   K 3.7 4.0   < > 3.7  3.7   < > 3.3* 3.3* 4.0  4.0    74*   < > 93*  93*   < > 97* 97* 97*   CO2 23.0 12.0*   < > 26.0  26.0   < > 27.0 28.0 32.0   ALKPHO 88 75  --  64  --   --   --   --    * 94*  --  102*  --   --   --   --    ALT 69* 77*  --  72*  --   --   --   --    BILT 2.4* 2.1*  --  1.9*  --   --   --   --    TP 7.2 5.9  --   5.2*  --   --   --   --     < > = values in this interval not displayed.       Estimated Creatinine Clearance: 122.5 mL/min (based on SCr of 0.96 mg/dL).    No results for input(s): \"TROP\", \"TROPHS\", \"CK\" in the last 168 hours.    No results for input(s): \"PTP\", \"INR\" in the last 168 hours.               Microbiology    No results found for this visit on 09/29/24.      Imaging: Reviewed in Epic.    Medications:    insulin aspart  1-68 Units Subcutaneous TID AC&HS    insulin aspart  1-20 Units Subcutaneous TID CC and HS    sodium phosphate  15 mmol Intravenous Once    pantoprazole  40 mg Oral BID AC    enoxaparin  40 mg Subcutaneous Daily    insulin degludec  25 Units Subcutaneous Daily    gabapentin  300 mg Oral QAM    gabapentin  600 mg Oral Nightly    thiamine  100 mg Oral Daily    multivitamin with minerals  1 tablet Oral Daily    folic acid  1 mg Oral Daily       Assessment & Plan:      #NAKUL  #Hyponatremia  #Metabolic acidosis due to alcohol ketoacidosis and dehydration  -IVF  -renal on consult  -improving  -monitor off IVF  -tolerating a diet     #Alcohol abuse  #Elevated LFT  -recent 5 day binge  -MTV/ FA. Thiamine     #DM type II  -hyperglycemia protocol  -increased basal and correction insulin     #coffee ground emesis  -PPI  -EGD done showing severe esophagitis and diffuse gastritis      Jo Ann Oliva MD    Supplementary Documentation:     Quality:  DVT Mechanical Prophylaxis:   SCDs, Early ambuation  DVT Pharmacologic Prophylaxis   Medication    enoxaparin (Lovenox) 40 MG/0.4ML SUBQ injection 40 mg                Code Status: Full Code  Man: No urinary catheter in place  Man Duration (in days):   Central line:    LILLIAN:     Discharge is dependent on: progress  At this point Mr. Freitas is expected to be discharge to: home    The 21st Century Cures Act makes medical notes like these available to patients in the interest of transparency. Please be advised this is a medical document. Medical documents  are intended to carry relevant information, facts as evident, and the clinical opinion of the practitioner. The medical note is intended as peer to peer communication and may appear blunt or direct. It is written in medical language and may contain abbreviations or verbiage that are unfamiliar.

## 2024-10-01 NOTE — PLAN OF CARE
Assumed care of patient at change of shift. Patient alert and oriented. VSS. Patient ambulating to bathroom and in the halls without difficulty. Tolerating advanced diet. Insulin orders adjusted per Diabetes APRN today. Transfer orders in place, awaiting bed on floor. See MAR and flowsheets for additional details.

## 2024-10-01 NOTE — PROGRESS NOTES
Patient did well overnight there was no acute overnight events.    Blood sugars have been slightly higher he did eat dinner at about midnight last night which bumped up his sugar significantly.  Today we did increase his Tresiba 24 units he did not get mealtime insulin 4 units for his meal or his corrective insulin.  Most of his meals occurred in the evening and afternoon we will consult endocrinology as he will need an outpatient follow-up and evaluation as he had a poor glucose control at home.    His abdomen was soft nontender nondistended.  He had good pulses of bilateral upper and lower extremities.  He has absolutely no signs of alcohol withdrawal whatsoever.  We have discontinued all of the CIWA scores at this time.    His labs this morning are showing a sodium of 135 and glucose of 253 white blood cell count is 5 hemoglobin is 12,000 relatively stable to slightly downtrending.      In summary this is a 44-year-old male who presents to us with alcohol withdrawal acute kidney injury hyponatremia and upper GI problems    Alcohol withdrawal  Insulin-dependent diabetes  Poorly controlled  Mitral valve prolapse  Asthma  GI bleed  Gastric esophageal ulceration  Hyponatremia  Acute kidney injury and acute renal failure    Plan    Neuro  Alcohol withdrawal  Discontinue all CIWA scores and anything but gabapentin patient has no signs of withdrawal    Cardiac   Follow-up for mitral valve prolapse as an outpatient    GI  Esophageal ulceration  Remains on sucralfate and Protonix    Renal  Creatinine has normalized  Sodium has also normalized 135 slightly on the low end but improving Endo will be consulted he is currently on 25 units of Tresiba 4 scheduled mealtime insulin plus a sliding scale.  He may need more long-acting insulin than were giving him and may be a higher mealtime dose of scheduled short acting insulin.  His A1c was poorly controlled on his regimen at home and therefore endocrine follow-up will be  essential.  Tubes lines and drains peripheral IVs no Man tolerating diet floor    Critical care attending    32 minutes of critical care time were spent at the bedside performing history, physical, complex data interpretation, radiographic interpretation, discussion with consultants, and creating a diagnostic and therapeutic treatment plan for this critically ill patient. Time spent was excluding time on procedures and resident teaching.    Date of Service    October 1st 2024

## 2024-10-01 NOTE — PROGRESS NOTES
[facility]   part of [facility]     Nephrology Progress Note    Rambo Kennedycharargentina Patient Status:  Inpatient    3/19/1980 MRN [id number]   [facility] 4SW-A Attending Jo Ann Oliva MD   Hosp Day # 2 PCP None Pcp       SUBJECTIVE:    No acute events      Current Facility-Administered Medications:     sodium phosphate 15 mmol in 0.9% NaCl 100mL IVPB premix, 15 mmol, Intravenous, Once    sodium phosphate 30 mmol in sodium chloride 0.9% 150 mL IVPB, 30 mmol, Intravenous, Once    insulin aspart (NovoLOG) 100 Units/mL FlexPen 4 Units, 4 Units, Subcutaneous, TID CC    loperamide (Imodium) cap 2 mg, 2 mg, Oral, QID PRN    [COMPLETED] pantoprazole (Protonix) 40 mg in sodium chloride 0.9% PF 10 mL IV push, 40 mg, Intravenous, Q12H **FOLLOWED BY** pantoprazole (Protonix) DR tab 40 mg, 40 mg, Oral, BID AC    sucralfate (Carafate) 1 GM/10ML oral suspension 1 g, 1 g, Oral, TID AC and HS (2200)    enoxaparin (Lovenox) 40 MG/0.4ML SUBQ injection 40 mg, 40 mg, Subcutaneous, Daily    insulin aspart (NovoLOG) 100 Units/mL FlexPen 2-10 Units, 2-10 Units, Subcutaneous, TID AC and HS    insulin degludec (Tresiba) 100 units/mL flextouch 25 Units, 25 Units, Subcutaneous, Daily    ondansetron (Zofran) 4 MG/2ML injection 4 mg, 4 mg, Intravenous, Q6H PRN    prochlorperazine (Compazine) 10 MG/2ML injection 5 mg, 5 mg, Intravenous, Q8H PRN    alum-mag hydroxide-simethicone (Maalox) 200-200-20 MG/5ML oral suspension 30 mL, 30 mL, Oral, QID PRN    albuterol (Ventolin HFA) 108 (90 Base) MCG/ACT inhaler 2 puff, 2 puff, Inhalation, Q6H PRN    glucose (Dex4) 15 GM/59ML oral liquid 15 g, 15 g, Oral, Q15 Min PRN **OR** glucose (Glutose) 40% oral gel 15 g, 15 g, Oral, Q15 Min PRN **OR** glucose-vitamin C (Dex-4) chewable tab 4 tablet, 4 tablet, Oral, Q15 Min PRN **OR** dextrose 50% injection 50 mL, 50 mL, Intravenous, Q15 Min PRN **OR** glucose (Dex4) 15 GM/59ML oral liquid 30 g, 30 g, Oral, Q15 Min PRN **OR** glucose (Glutose)  40% oral gel 30 g, 30 g, Oral, Q15 Min PRN **OR** glucose-vitamin C (Dex-4) chewable tab 8 tablet, 8 tablet, Oral, Q15 Min PRN    polyethylene glycol (PEG 3350) (Miralax) 17 g oral packet 17 g, 17 g, Oral, Daily PRN    sennosides (Senokot) tab 17.2 mg, 17.2 mg, Oral, Nightly PRN    bisacodyl (Dulcolax) 10 MG rectal suppository 10 mg, 10 mg, Rectal, Daily PRN    fleet enema (Fleet) rectal enema 133 mL, 1 enema, Rectal, Once PRN    gabapentin (Neurontin) cap 300 mg, 300 mg, Oral, QAM    gabapentin (Neurontin) tab 600 mg, 600 mg, Oral, Nightly    thiamine (Vitamin B1) tab 100 mg, 100 mg, Oral, Daily    multivitamin with minerals (Thera M Plus) tab 1 tablet, 1 tablet, Oral, Daily    folic acid (Folvite) tab 1 mg, 1 mg, Oral, Daily      Physical Exam:   /74 (BP Location: Right arm)   Pulse 85   Temp 98.9 °F (37.2 °C) (Temporal)   Resp 15   Wt 244 lb 11.4 oz (111 kg)   SpO2 96%   BMI 29.02 kg/m²   Temp (24hrs), Av.5 °F (36.9 °C), Min:98 °F (36.7 °C), Max:98.9 °F (37.2 °C)       Intake/Output Summary (Last 24 hours) at 10/1/2024 0932  Last data filed at 10/1/2024 0800  Gross per 24 hour   Intake 2291.9 ml   Output --   Net 2291.9 ml     Last 3 Weights   10/01/24 0600 244 lb 11.4 oz (111 kg)   24 0000 243 lb 2.7 oz (110.3 kg)   24 1000 235 lb 7.2 oz (106.8 kg)   24 0918 235 lb 7.2 oz (106.8 kg)   24 1143 230 lb (104.3 kg)   20 1627 202 lb 6.4 oz (91.8 kg)   20 1129 160 lb (72.6 kg)   20 1452 205 lb (93 kg)     General: Alert and oriented in no apparent distress.  HEENT: No scleral icterus, MMM  Neck: Supple, no DELFINO or thyromegaly  Cardiac: Regular rate and rhythm, S1, S2 normal, no murmur or rub  Lungs: Clear without wheezes, rales, rhonchi.    Abdomen: Soft, non-tender. + bowel sounds, no palpable organomegaly  Extremities: Without clubbing, cyanosis or edema.  Neurologic:  moving all extremities  Skin: Warm and dry, no rash      Recent Labs     24  0029  09/30/24  0411 09/30/24 0952 09/30/24  1427 10/01/24  0433   WBC 6.7 6.5 6.9 5.1 5.1   HGB 13.1 12.8* 13.4 12.6* 12.7*   MCV 78.8* 77.8* 78.7* 76.2* 80.5   .0* 126.0* 124.0* 119.0* 108.0*       Recent Labs     09/29/24  0547 09/29/24  1148 09/30/24  0411 09/30/24  0952 09/30/24  1427 09/30/24  2027 10/01/24  0433   *   < > 128*  128* 127* 132* 132* 135*   K 4.0   < > 3.7  3.7 3.5 3.3* 3.3* 4.0  4.0   CL 74*   < > 93*  93* 93* 97* 97* 97*   CO2 12.0*   < > 26.0  26.0 24.0 27.0 28.0 32.0   BUN 31*   < > 31*  31* 27* 21 15 13   CREATSERUM 3.29*   < > 1.47*  1.47* 1.27 1.11 0.92 0.96   CA 7.8*   < > 7.5*  7.5* 7.9* 7.8* 7.8* 8.2*   MG 1.8  --  3.2*  --   --   --  2.7*   PHOS  --   --   --   --   --   --  1.8*    < > = values in this interval not displayed.       Recent Labs     09/29/24 0547 09/30/24 0411   ALT 77* 72*   AST 94* 102*   ALB 3.7 3.3       Impression/Plan:      1.  NAKUL- in setting of profound volume depletion with severe N/V. Improved   - monitor      2.   Hyponatremia- suspect due to hypovolemia with component related to hyperglycemia (pseudohypona) as well..  OK to correct reasonably quickly given documented nl na on 9/25.   Improved      3.  Acidosis- suspect due to NAKUL and well as poss component starvation ketosis.  No evidence of DKA.  resolved     4.  Etoh use/abuse- CIWA.  Has been stable    5.  IDDM- per primary service    6.  GERD- on IV PPI    7.  Electrolytes- replace per protocol     Questions/concerns were discussed with patient and/or family by bedside.      Will follow prn. Please call w/ questions.     Huber Murphy  10/1/2024

## 2024-10-01 NOTE — CONSULTS
[facility]  Diabetes Consult Note    Rambo ORTIZ Sheargentina Patient Status:  Inpatient    3/19/1980 MRN [id number]   Location [facility] 4SW-A Attending Jo Ann Oliva MD   Hosp Day # 2 PCP None Pcp     Reason for Consult:   Management recommendations in setting of uncontrolled T1/AV DM      Provider Requesting Consult:  Dr. Jose (Critical Care)      Diagnosis:  Patient Active Problem List   Diagnosis    Lumbar disc herniation    Bilateral hearing loss, unspecified hearing loss type    Injury of adductor muscle and tendon of right thigh    Labral tear of right hip joint    Primary osteoarthritis of right hip    Injury of adductor muscle and tendon of right thigh, sequela    Elevated blood pressure, situational    DDD (degenerative disc disease), lumbar    Closed displaced fracture of acromial end of left clavicle with routine healing, subsequent encounter    Lumbar facet arthropathy    Opiate use    Chronic midline low back pain without sciatica    Gastroesophageal reflux disease without esophagitis    Exercise-induced asthma (HCC)    Screening for disorder of blood and blood-forming organs    Folliculitis    Alcohol withdrawal syndrome with complication (HCC)    Elevated blood pressure    Hand pain, right    Seizure (HCC)    Hyponatremia    NAKUL (acute kidney injury) (HCC)    Metabolic acidosis    Alcohol withdrawal syndrome without complication (HCC)    Type 1 diabetes mellitus without complication (HCC)         Medical History:  Past Medical History:    Asthma (HCC)    Back pain    2 bulging disks    Bilateral hearing loss, unspecified hearing loss type    Exercise-induced asthma (HCC)    Mitral valve prolapse    has been checked for heart problems and arrythmia at [place] (about )    Rib fracture    left side    Sleep walking    Type 1 diabetes mellitus (HCC)     Past Surgical History:   Procedure Laterality Date    Tonsillectomy  age 17     Family History   Problem Relation Age of Onset     Breast Cancer Mother     Heart Disease Father         pacemaker    Heart Disease Sister         pacemaker     Heart Disease Paternal Aunt         pacemaker    Heart Disease Paternal Aunt         pacemaker         Diabetes history:  Type:  T1/AV DM  Onset: 2021  Family history of DM: dad w/ T2DM      Allergies: No Known Allergies      Medications: Complete list reviewed. Active diabetes medications include degludec and aspart.      Labs:  Recent Labs   Lab 24  0739 24  1208 24  1618 09/30/24  2106 10/01/24  0627   PGLU 254* 271* 189* 201* 231*     Recent Labs     24  0547 24  1133 24  0411 24  0739 24  0952 24  1208 24  1427 24  1618 09/30/24  2027 09/30/24  2106 10/01/24  0433 10/01/24  0627   *   < > 128*  128*  --  127*  --  132*  --  132*  --  135*  --    CL 74*   < > 93*  93*  --  93*  --  97*  --  97*  --  97*  --    CO2 12.0*   < > 26.0  26.0  --  24.0  --  27.0  --  28.0  --  32.0  --    BUN 31*   < > 31*  31*  --  27*  --  21  --  15  --  13  --    CREATSERUM 3.29*   < > 1.47*  1.47*  --  1.27  --  1.11  --  0.92  --  0.96  --    A1C 8.7*  --   --   --   --   --   --   --   --   --   --   --    PGLU  --    < >  --    < >  --    < >  --    < >  --    < >  --  231*   CA 7.8*   < > 7.5*  7.5*  --  7.9*  --  7.8*  --  7.8*  --  8.2*  --    ALB 3.7  --  3.3  --   --   --   --   --   --   --   --   --     < > = values in this interval not displayed.                    History of Present Illness: Rambo Freitas is a 44 year old male with a PMH of ETOH abuse, seizure s/p ETOH withdrawals (2017), AV/T1 DM, mitral valve prolapse, asthma, GERD, and DDD admitted 2024 with complaints of dark, coffee-ground emesis x24 hours s/p ETOH binge x5 days. ED evaluation revealed hyperglycemia (A1C 9%), alcoholic ketoacidosis, NAKUL, and hyponatremia.      Assessment/Recommendations:  Upon interview today, patient states he was  originally diagnosed with T2DM while incarcerated and was treated with metformin; since that time, he was seen by a PCP while incarcerated who performed antibody testing and diagnosed him with AV/T1 DM. Since then, he has been on basal/bolus infusion without issue. 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 He states compliance with his basal/bolus insulin regimen prior to his ETOH binge that caused admission. Explained to patient he will continue to require basal/bolus insulin; however, doses will need adjusting given elevated A1C on admission. Patient understanding of plan and questions about possible CGM and insulin pump as he believes it will increase his quality of life; explained that those decisions can be made with outpatient endocrinology team.       Plan:  Inpatient recommendations -   Degludec = given fasted AM glucose of 231, increase to 25u every day  Aspart = given elevated AC/HS glucoses yesterday, increase to 1:30>140 & 1:10gm CHO  Outpatient recommendations - return to lantus and humalog/novolog      Prescription Recommendations:  Traditional [place] Medicaid:  Insulin: Humalog, Levemir, Lantus   Supplies:  BD pen needles ([employer])  Glucometer:  One Touch Ultra ([employer]OTA)  CGM:  Dexcom G6 (with Prior Auth)  Insulin Pump:  Omni Pod (with Prior Auth)      Provider F/U Recommendations:  PCP - Dr. Gamble ([place])  Endocrinology - Dr. Johnson ([facility])      A total of 60 minutes were spent with the patient, 100% was spent counseling and coordinating care for 1 diabetes self-management including nutrition, exercise, blood glucose monitoring, insulin administration, medications, treatment options, follow-up coordination and resources.    Kyra Espinoza, APRN  10/1/2024  9:35 AM

## 2024-10-02 VITALS
TEMPERATURE: 98 F | WEIGHT: 244.5 LBS | SYSTOLIC BLOOD PRESSURE: 139 MMHG | OXYGEN SATURATION: 96 % | RESPIRATION RATE: 17 BRPM | BODY MASS INDEX: 29 KG/M2 | HEART RATE: 63 BPM | DIASTOLIC BLOOD PRESSURE: 99 MMHG

## 2024-10-02 LAB
ANION GAP SERPL CALC-SCNC: 4 MMOL/L (ref 0–18)
BASOPHILS # BLD AUTO: 0.03 X10(3) UL (ref 0–0.2)
BASOPHILS NFR BLD AUTO: 0.5 %
BUN BLD-MCNC: 14 MG/DL (ref 9–23)
CALCIUM BLD-MCNC: 9.5 MG/DL (ref 8.7–10.4)
CHLORIDE SERPL-SCNC: 101 MMOL/L (ref 98–112)
CO2 SERPL-SCNC: 32 MMOL/L (ref 21–32)
CREAT BLD-MCNC: 0.85 MG/DL
EGFRCR SERPLBLD CKD-EPI 2021: 109 ML/MIN/1.73M2 (ref 60–?)
EOSINOPHIL # BLD AUTO: 0.28 X10(3) UL (ref 0–0.7)
EOSINOPHIL NFR BLD AUTO: 4.4 %
ERYTHROCYTE [DISTWIDTH] IN BLOOD BY AUTOMATED COUNT: 12.6 %
GLUCOSE BLD-MCNC: 181 MG/DL (ref 70–99)
GLUCOSE BLD-MCNC: 212 MG/DL (ref 70–99)
GLUCOSE BLD-MCNC: 246 MG/DL (ref 70–99)
HCT VFR BLD AUTO: 40.5 %
HGB BLD-MCNC: 14.2 G/DL
IMM GRANULOCYTES # BLD AUTO: 0.03 X10(3) UL (ref 0–1)
IMM GRANULOCYTES NFR BLD: 0.5 %
LYMPHOCYTES # BLD AUTO: 1.79 X10(3) UL (ref 1–4)
LYMPHOCYTES NFR BLD AUTO: 27.8 %
MAGNESIUM SERPL-MCNC: 2.3 MG/DL (ref 1.6–2.6)
MCH RBC QN AUTO: 28.7 PG (ref 26–34)
MCHC RBC AUTO-ENTMCNC: 35.1 G/DL (ref 31–37)
MCV RBC AUTO: 82 FL
MONOCYTES # BLD AUTO: 0.74 X10(3) UL (ref 0.1–1)
MONOCYTES NFR BLD AUTO: 11.5 %
NEUTROPHILS # BLD AUTO: 3.56 X10 (3) UL (ref 1.5–7.7)
NEUTROPHILS # BLD AUTO: 3.56 X10(3) UL (ref 1.5–7.7)
NEUTROPHILS NFR BLD AUTO: 55.3 %
OSMOLALITY SERPL CALC.SUM OF ELEC: 291 MOSM/KG (ref 275–295)
PHOSPHATE SERPL-MCNC: 3.6 MG/DL (ref 2.4–5.1)
PLATELET # BLD AUTO: 122 10(3)UL (ref 150–450)
PLATELETS.RETICULATED NFR BLD AUTO: 9.8 % (ref 0–7)
POTASSIUM SERPL-SCNC: 4 MMOL/L (ref 3.5–5.1)
RBC # BLD AUTO: 4.94 X10(6)UL
SODIUM SERPL-SCNC: 137 MMOL/L (ref 136–145)
WBC # BLD AUTO: 6.4 X10(3) UL (ref 4–11)

## 2024-10-02 PROCEDURE — 99239 HOSP IP/OBS DSCHRG MGMT >30: CPT | Performed by: INTERNAL MEDICINE

## 2024-10-02 PROCEDURE — 99233 SBSQ HOSP IP/OBS HIGH 50: CPT | Performed by: CLINICAL NURSE SPECIALIST

## 2024-10-02 RX ORDER — BLOOD-GLUCOSE METER
EACH MISCELLANEOUS
Qty: 1 KIT | Refills: 0 | Status: SHIPPED | OUTPATIENT
Start: 2024-10-02

## 2024-10-02 RX ORDER — INSULIN LISPRO 100 [IU]/ML
INJECTION, SOLUTION INTRAVENOUS; SUBCUTANEOUS
Qty: 5 EACH | Refills: 3 | Status: SHIPPED | OUTPATIENT
Start: 2024-10-02 | End: 2024-10-07

## 2024-10-02 RX ORDER — GABAPENTIN 300 MG/1
300 CAPSULE ORAL 2 TIMES DAILY
Status: SHIPPED | COMMUNITY
Start: 2024-10-02 | End: 2024-10-07

## 2024-10-02 RX ORDER — BLOOD SUGAR DIAGNOSTIC
STRIP MISCELLANEOUS
Qty: 100 STRIP | Refills: 6 | Status: SHIPPED | OUTPATIENT
Start: 2024-10-02

## 2024-10-02 RX ORDER — PEN NEEDLE, DIABETIC 32GX 5/32"
NEEDLE, DISPOSABLE MISCELLANEOUS
Qty: 100 EACH | Refills: 6 | Status: SHIPPED | OUTPATIENT
Start: 2024-10-02 | End: 2024-10-07

## 2024-10-02 RX ORDER — LANCETS 33 GAUGE
EACH MISCELLANEOUS
Qty: 100 EACH | Refills: 6 | Status: SHIPPED | OUTPATIENT
Start: 2024-10-02

## 2024-10-02 RX ORDER — INSULIN GLARGINE 100 [IU]/ML
25 INJECTION, SOLUTION SUBCUTANEOUS NIGHTLY
Qty: 5 EACH | Refills: 3 | Status: SHIPPED | OUTPATIENT
Start: 2024-10-02

## 2024-10-02 RX ORDER — PANTOPRAZOLE SODIUM 40 MG/1
40 TABLET, DELAYED RELEASE ORAL
Qty: 60 TABLET | Refills: 1 | Status: SHIPPED | OUTPATIENT
Start: 2024-10-02 | End: 2024-11-01

## 2024-10-02 NOTE — PLAN OF CARE
Assumed care of patient upon change of shift. Patient alert and oriented. Dexcom given today per diabetes APRN. Patient walked with PT and did stairs today. Discharge orders received. Patient discharged via wheelchair and accompanied by mother. All discharge instructions and medication list provided. See MAR and flowsheets for additional details.

## 2024-10-02 NOTE — DISCHARGE INSTRUCTIONS
Take protonix twice daily for 4 weeks then once daily after that until seen by GI as outpatient for repeat EGD in 8 weeks.

## 2024-10-02 NOTE — CONSULTS
[facility]  Diabetes Consult Note    Rambo ORTIZ Sheargentina Patient Status:  Inpatient    3/19/1980 MRN [id number]   Location [facility] 4SW-A Attending Jo Ann Oliva MD   Hosp Day # 3 PCP None Pcp     Reason for Consult:   Management recommendations in setting of uncontrolled T1/AV DM      Provider Requesting Consult:  Dr. Jose (Critical Care)      Diagnosis:  Patient Active Problem List   Diagnosis    Lumbar disc herniation    Bilateral hearing loss, unspecified hearing loss type    Injury of adductor muscle and tendon of right thigh    Labral tear of right hip joint    Primary osteoarthritis of right hip    Injury of adductor muscle and tendon of right thigh, sequela    Elevated blood pressure, situational    DDD (degenerative disc disease), lumbar    Closed displaced fracture of acromial end of left clavicle with routine healing, subsequent encounter    Lumbar facet arthropathy    Opiate use    Chronic midline low back pain without sciatica    Gastroesophageal reflux disease without esophagitis    Exercise-induced asthma (HCC)    Screening for disorder of blood and blood-forming organs    Folliculitis    Alcohol withdrawal syndrome with complication (HCC)    Elevated blood pressure    Hand pain, right    Seizure (HCC)    Hyponatremia    NAKUL (acute kidney injury) (HCC)    Acidosis    Alcohol withdrawal syndrome without complication (HCC)    Type 1 diabetes mellitus without complication (HCC)         Medical History:  Past Medical History:    Asthma (HCC)    Back pain    2 bulging disks    Bilateral hearing loss, unspecified hearing loss type    Exercise-induced asthma (HCC)    Mitral valve prolapse    has been checked for heart problems and arrythmia at [place] (about )    Rib fracture    left side    Sleep walking    Type 1 diabetes mellitus (HCC)     Past Surgical History:   Procedure Laterality Date    Tonsillectomy  age 17     Family History   Problem Relation Age of Onset    Breast Cancer  Mother     Heart Disease Father         pacemaker    Heart Disease Sister         pacemaker     Heart Disease Paternal Aunt         pacemaker    Heart Disease Paternal Aunt         pacemaker         Diabetes history:  Type:  T1/AV DM  Onset: 2021  Family history of DM: dad w/ T2DM      Allergies: No Known Allergies      Medications: Complete list reviewed. Active diabetes medications include degludec and aspart.      Labs:  Recent Labs   Lab 10/01/24  0627 10/01/24  1217 10/01/24  1801 10/01/24  2300 10/02/24  0621   PGLU 231* 245* 248* 266* 181*     Recent Labs     24  0547 24  1133 24  0411 24  0739 24  0952 24  1208 24  1427 24  1618 247 24  2106 10/01/24  0433 10/01/24  0627   *   < > 128*  128*  --  127*  --  132*  --  132*  --  135*  --    CL 74*   < > 93*  93*  --  93*  --  97*  --  97*  --  97*  --    CO2 12.0*   < > 26.0  26.0  --  24.0  --  27.0  --  28.0  --  32.0  --    BUN 31*   < > 31*  31*  --  27*  --  21  --  15  --  13  --    CREATSERUM 3.29*   < > 1.47*  1.47*  --  1.27  --  1.11  --  0.92  --  0.96  --    A1C 8.7*  --   --   --   --   --   --   --   --   --   --   --    PGLU  --    < >  --    < >  --    < >  --    < >  --    < >  --  231*   CA 7.8*   < > 7.5*  7.5*  --  7.9*  --  7.8*  --  7.8*  --  8.2*  --    ALB 3.7  --  3.3  --   --   --   --   --   --   --   --   --     < > = values in this interval not displayed.                    History of Present Illness: Rambo Freitas is a 44 year old male with a PMH of ETOH abuse, seizure s/p ETOH withdrawals (2017), AV/T1 DM, mitral valve prolapse, asthma, GERD, and DDD admitted 2024 with complaints of dark, coffee-ground emesis x24 hours s/p ETOH binge x5 days. ED evaluation revealed hyperglycemia (A1C 9%), alcoholic ketoacidosis, NAKUL, and hyponatremia.      Assessment/Recommendations:  Upon interview today, patient states he feels better than the  previous few days; is still a bit anxious to go home and would like to trial stairs with hospital staff before leaving. Explained to patient his glucoses have been better controlled over last 24 hours; will continue current basal/bolus insulin regimen. Patient states he is competent with insulin pen administration, as well as glucometer use at home. Plan will be basal/bolus insulin at discharge with outpatient endocrinology follow-up; patient states understanding of and willingness to participate in plan of care.      Plan:  Inpatient recommendations -   Degludec = given fasted AM glucose of 181, continue 25u every day  Aspart = given AC/HS glucoses yesterday of 245, 248 & 266, continue 1:30>140 & 1:10gm CHO  Outpatient recommendations -   Basal = 25u lantus every day (preferred on Medicaid formulary)  Bolus = humalog 1:30>140 (preferred on Medicaid formulary)      Prescription Recommendations:  Traditional [place] Medicaid:  Insulin: Humalog, Levemir, Lantus   Supplies:  BD pen needles ([employer]ylin)  Glucometer:  One Touch Ultra ([employer])  CGM:  Dexcom G6&7 (with Prior Auth)  Insulin Pump:  Omni Pod (with Prior Auth)      Provider F/U Recommendations:  PCP - would like to establish care at [place] (phone number provided for appointment)  Endocrinology - Dr. Johnson ([place])      A total of 60 minutes were spent with the patient, 100% was spent counseling and coordinating care for 1 diabetes self-management including nutrition, exercise, blood glucose monitoring, insulin administration, medications, treatment options, follow-up coordination and resources.    STANLEY Shelby  10/2/2024  9:35 AM

## 2024-10-02 NOTE — SPIRITUAL CARE NOTE
Spiritual Care Visit Note    Patient Name: Rambo Freitas Date of Spiritual Care Visit: 10/02/24   : 3/19/1980 Primary Dx: Hyponatremia       Referred By:      Spiritual Care Taxonomy:    Intended Effects: Build relationship of care and support    Methods: Collaborate with care team member;Explore [place] and values;Exploring hope;Offer emotional support    Interventions: Acknowledge current situation;Active listening;Explain  role    Visit Type/Summary:     - Spiritual Care: Consulted with RN prior to visit. Offered empathic listening and emotional support. Patient and family expressed appreciation for  visit. Provided information regarding how to contact Spiritual Care and left a Spiritual Care information card.  remains available as needed for follow up.    Spiritual Care support can be requested via an Epic consult. For urgent/immediate needs, please contact the On Call  at: [facility]: ext [phone]    Phoebe Chicas MA

## 2024-10-02 NOTE — PHYSICAL THERAPY NOTE
PHYSICAL THERAPY TREATMENT NOTE - INPATIENT    Room Number: 459/459-A     Session: 1     Number of Visits to Meet Established Goals: 3    Presenting Problem: Hyponatremia, ETOH withdrawal syndrome  Co-Morbidities : asthma, DM, chronic back pain, etoh abuse    PHYSICAL THERAPY ASSESSMENT   Patient demonstrates excellent progress this session, goals  met this session .  Patient has been evaluated and presents with no further inpatient physical therapy needs.  Patient discharged from inpatient physical therapy services at this time.   Please reconsult if there is a change in status that would necessitate PT services.    Nursing Mobility Recommendation :  (Up with SBA)  PT Device Recommendation: None      CURRENT GOALS     Goal #1 Patient is able to demonstrate supine - sit EOB @ level: modified independent      Goal #2 Patient is able to demonstrate transfers Sit to/from Stand at assistance level: modified independent      Goal #3 Patient is able to ambulate 150 feet with assist device: none at assistance level: modified independent      Goal #4 The pt will ascend/descend 1 flight of stairs with use of a handrail with SBA.   Goal #5     Goal #6     Goal Comments: Goals established on 2024  10/2/2024 all goals achieved     SUBJECTIVE  \"My head feels a little fuzzy.\"    OBJECTIVE  Precautions: Bed/chair alarm;Seizure (L ankle monitor)    WEIGHT BEARING RESTRICTION     PAIN ASSESSMENT   Ratin  Location: throat and lower abdomen  Management Techniques: Relaxation;Repositioning    BALANCE                                                                                                                       Static Sitting: Good  Dynamic Sitting: Good           Static Standing: Good  Dynamic Standing: Good    ACTIVITY TOLERANCE  O2 Saturation: 99%  Room air  No shortness of breath  Heart Rate: 63 bpm in static standing  Blood Pressure: Standing 139/99      AM-PAC '6-Clicks' INPATIENT SHORT FORM - BASIC MOBILITY  How  much difficulty does the patient currently have...  Patient Difficulty: Turning over in bed (including adjusting bedclothes, sheets and blankets)?: None   Patient Difficulty: Sitting down on and standing up from a chair with arms (e.g., wheelchair, bedside commode, etc.): None   Patient Difficulty: Moving from lying on back to sitting on the side of the bed?: None   How much help from another person does the patient currently need...   Help from Another: Moving to and from a bed to a chair (including a wheelchair)?: None   Help from Another: Need to walk in hospital room?: None   Help from Another: Climbing 3-5 steps with a railing?: A Little     AM-PAC Score:  Raw Score: 23   Approx Degree of Impairment: 11.2%   Standardized Score (AM-PAC Scale): 56.93   CMS Modifier (G-Code): CI    FUNCTIONAL ABILITY STATUS  Gait Assessment   Functional Mobility/Gait Assessment  Gait Assistance: Independent  Distance (ft): 300  Assistive Device: None  Pattern:  (step through gait pattern)  Stairs: Stairs  How Many Stairs: 12  Device: 1 Rail  Assist: Supervision  Pattern: Ascend and Descend  Ascend and Descend : Reciprocal    Skilled Therapy Provided    Bed Mobility:  Rolling: Independent   Supine>Sit: NT  Sit>Supine: Independent     Transfer Mobility:  Sit>Stand: Independent   Stand<>Sit: Independent   Gait: Independent  Stairs: SBA    4 Item Dynamic Gait Index Score: 12/12 Fall Risk: no  [Scores of 10 or less indicate increased risk for falls]  Gait level surface: 3  Grading: [place] the lowest category that applies.  (3)  Normal: Walks 20’, no assistive devices, good speed, no evidence of imbalance, normal gait pattern.  (2)  Mild Impairment: Walks 20’, uses assistive devices, slower speed, mild gait deviations.  (1)  Moderate Impairment: Walks 20’, slow speed, abnormal gait pattern, evidence of imbalance.  (0)  Severe Impairment: Cannot walk 20’ without assistance, severe gait deviations or imbalance.     Change in gait speed:  3  Grading: [place] the lowest category that applies.  (3)  Normal: Able to smoothly change walking speed without loss of balance or gait deviation. Shows a significant difference in walking speed between normal, fast and slow speeds.   (2)  Mild Impairment: Is able to change speed but demonstrates mild gait deviations, or no gait deviations but unable to achieve a significant change in velocity, or uses an assistive device.   (1)  Moderate Impairment: Makes only minor adjustments to walking speed, or accomplishes a change in speed with significant gait deviations, or changes speed but has significant gait deviations, or changes speed but loses balance but is able to recover and continue walking.  (0)  Severe Impairment: Cannot change speeds, or loses balance and has to reach for wall or be caught.    Gait with horizontal head turns: 3  (3)  Normal: Performs head turns smoothly with no change in gait  (2)  Mild Impairment: Performs head turns smoothly with slight change in gait velocity, i.e., minor disruption to smooth gait path or uses walking aid.  (1) Moderate Impairment: Performs head turns with moderate change in gait velocity, slows down, staggers but recovers, can continue to walk.  (0)  Severe Impairment: Performs task with severe disruption of gait, i.e., staggers outside 15” path, loses balance, stops, reaches for wall.    Gait with vertical head turns: 3  Grading: [place] the lowest category that applies.  (3) Normal: Performs head turns smoothly with no change in gait.  (2) Mild Impairment: Performs head turns smoothly with slight change in gait velocity, i.e., minor disruption to smooth gait path or uses walking aid.  (1) Moderate Impairment: Performs head turns with moderate change in gait velocity, slows down, staggers but recovers, can continue to walk.  (0) Severe Impairment: Performs task with severe disruption of gait, i.e., staggers outside 15” path, loses balance, stops, reaches for wall.                     Occulomotor & Vestibulo-Ocular Exam:  Spontaneous Nystagmus: Negative  Gaze Evoked Nystagmus: RNegative, LNegative  Smooth Pursuit: R Negative, LNegative  Saccades: Negative  VOR Canellation: Slow Negative, Rapid Negative  Head Thrust: R 1-2 catch up saccades, L 1-2 catch up saccades      Patient End of Session: In bed;Needs met;Call light within reach;RN aware of session/findings;All patient questions and concerns addressed    PT Session Time: 25 minutes  Gait Training: 15 minutes  Neuromuscular Re-education: 10 minutes

## 2024-10-02 NOTE — PLAN OF CARE
Patient alert and oriented x4. With complaints of indigestion and shortness of breath on exertion. Diet advanced as tolerated. See flowsheets and see MAR.

## 2024-10-02 NOTE — PROGRESS NOTES
Patient did well overnight with no acute overnight events.  No signs of alcohol withdrawal.  On exam he was awake alert oriented concerned about when he needed to call his  afternoon today.    We discussed discharge patient was accompanied by his mother plan for discharge.    Spoke with hospitalist and the endocrinology team they have set up the patient's insulin requirements at home.  He has follow-up with endocrinology and he is aware we are trying to get him a continuous glucose monitor.    Plan  Discharge to home  Continuous glucose monitor  Insulin per Endo  Complete abstinence from alcohol abuse    Continue hydration as mentioned  PCP follow-up in the next 7 to 10 days    Disposition discharge home    Critical care attending    35 minutes of critical care time were spent at the bedside performing history, physical, complex data interpretation, radiographic interpretation, discussion with consultants, and creating a diagnostic and therapeutic treatment plan for this critically ill patient. Time spent was excluding time on procedures and resident teaching.    Date of Service    October 2, 2024

## 2024-10-02 NOTE — DISCHARGE SUMMARY
[facility]     Rambo Freitas Patient Status:  Inpatient    3/19/1980 MRN [id number]   Location [facility] 4SW-A Attending Yesenia Tinoco MD   Hosp Day # 3 PCP None Pcp     Date of Admission: 2024  Date of Discharge:   10/2/24    Discharge Disposition: Home or Self Care    Discharge Diagnosis:    #NAKUL, resolved   #Hyponatremia  #Metabolic acidosis due to alcohol ketoacidosis and dehydration  #Alcohol abuse  #Elevated LFT  #DM type II  #coffee ground emesis  -PPI  -EGD done showing severe esophagitis and diffuse gastritis       History of Present Illness: Rambo Freitas is a 44 year old male with medical history of MVP, DM type I and asthma who presents to the ER with complaints of nausea and vomiting.  He reports that for 5 days he was drinking heavy and stopped 1 day ago.  He started having intractable nausea and vomiting for the past day which was associated with burning in his esophagus .  He felt like his throat was closing and swollen.  In the ER he was noted to have NAKUL with alcohol ketoacidosis and dehydration. He also reports his emesis was like coffee ground and he has a history of GERD          Brief Synopsis: patient admitted to ICU and placed on CIWA. He was hydrated and his renal fc and electrolytes improved. He has overall done well and cleared medically for discharge.     Lace+ Score: 57  59-90 High Risk  29-58 Medium Risk  0-28   Low Risk       TCM Follow-Up Recommendation:  LACE 29-58: Moderate Risk of readmission after discharge from the hospital.      Procedures during hospitalization:   EGD    Consultants:  GI, ICU, nephrology, diabetes APN  Discharge Medication List:     Discharge Medications        START taking these medications        Instructions Prescription details   BD Pen Needle Aylin U/F 32G X 4 MM Misc  Generic drug: Insulin Pen Needle      Inject 3 times per day. Use a new pen needle with each injection   Quantity: 100 each  Refills: 6      Insulin Lispro (1 Unit Dial) 100 UNIT/ML Sopn  Commonly known as: HumaLOG KwikPen      Test blood glucose 3 times daily before meals. Inject 1 unit of insulin for every 30 points of glucose is greater than 140mg/dL.   Quantity: 5 each  Refills: 3     Lantus SoloStar 100 UNIT/ML Sopn  Generic drug: insulin glargine      Inject 25 Units into the skin nightly.   Quantity: 5 each  Refills: 3     OneTouch Delica Lancets 33G Misc      Test blood sugar 3 times per day.   Quantity: 100 each  Refills: 6     OneTouch Verio Flex System w/Device Kit      Test blood sugar 3 times per day.   Quantity: 1 kit  Refills: 0     OneTouch Verio Strp      Test blood sugar 3 times per day.   Quantity: 100 strip  Refills: 6     pantoprazole 40 MG Tbec  Commonly known as: Protonix      Take 1 tablet (40 mg total) by mouth 2 (two) times daily before meals.   Stop taking on: November 1, 2024  Quantity: 60 tablet  Refills: 1            CONTINUE taking these medications        Instructions Prescription details   albuterol 108 (90 Base) MCG/ACT Aers  Commonly known as: Ventolin HFA      Inhale 2 puffs into the lungs every 6 (six) hours as needed for Wheezing.   Refills: 0     gabapentin 300 MG Caps  Commonly known as: Neurontin      Take 1 capsule (300 mg total) by mouth 2 (two) times daily. 300 mg in am, 600 mg in pm   Refills: 0     Symbicort 160-4.5 MCG/ACT Aero  Generic drug: Budesonide-Formoterol Fumarate      INHALE 2 PUFFS BY MOUTH INTO THE LUNGS TWICE DAILY   Quantity: 30.6 g  Refills: 0            STOP taking these medications      insulin NPH & regular 70/30 (70-30) 100 Units/mL Susp  Commonly known as: NovoLIN 70/30        Omeprazole 40 MG Cpdr                  Where to Get Your Medications        These medications were sent to [id number] - [place], [place] - [address] AT [facility] [address], [phone], [phone]  [address], [facility] [address]      Phone: [phone]   BD Pen Needle Aylin U/F 32G X 4 MM  Misc  Insulin Lispro (1 Unit Dial) 100 UNIT/ML Sopn  Lantus SoloStar 100 UNIT/ML Sopn  OneTouch Delica Lancets 33G Misc  OneTouch Verio Flex System w/Device Kit  OneTouch Verio Strp  pantoprazole 40 MG Tbec         [place]MP reviewed[place]    Follow-up appointment:   Cullen Gamble MD  [address] [address]  [place] [address]  [phone]    Schedule an appointment as soon as possible for a visit in 1 week(s)  Hospital / Diabetes follow up    Rianna Johnson DO  [address] [facility] [address] [facility] [address]  [facility] [address]  [phone]    Go on 10/7/2024  Go to appointment with endocrinologist on 10/7 at 930AM    Appointments for Next 30 Days 10/2/2024 - 2024        Date Arrival Time Visit Type Length Department Provider     10/7/2024  9:30 AM  EXAM - ESTABLISHED [2844] 30 min [facility] Rianna Johnson DO    Patient Instructions:         Location Instructions:     Masks are optional for all patients and visitors, unless otherwise indicated.                      Vital signs:  Temp:  [97.7 °F (36.5 °C)-99.1 °F (37.3 °C)] 98.4 °F (36.9 °C)  Pulse:  [63-81] 63  Resp:  [11-25] 17  BP: (118-141)/(60-99) 139/99  SpO2:  [94 %-98 %] 96 %    Physical Exam:    General: No acute distress   Lungs: clear to auscultation  Cardiovascular: S1, S2  Abdomen: Soft NTND    -----------------------------------------------------------------------------------------------  PATIENT DISCHARGE INSTRUCTIONS: See electronic chart    Yesenia Tinoco MD    Total time spent on discharge plannin minutes     The 21st Century Cures Act makes medical notes like these available to patients in the interest of transparency. Please be advised this is a medical document. Medical documents are intended to carry relevant information, facts as evident, and the clinical opinion of the practitioner. The medical note is intended as peer to peer communication and may appear blunt or direct. It is  written in medical language and may contain abbreviations or verbiage that are unfamiliar.

## 2024-10-03 ENCOUNTER — PATIENT OUTREACH (OUTPATIENT)
Dept: CASE MANAGEMENT | Age: 44
End: 2024-10-03

## 2024-10-03 NOTE — CM/SW NOTE
Received message from RN that pt called and was having difficulty getting his prescriptions filled.    Called pt at home and he confirms he was having trouble with his Medicaid, but was able to resolve it and obtain all of his medications.    / to remain available for support and/or discharge planning.     Lenora Hoffmann MBA MSN, RN CTL/  [id number]

## 2024-10-07 ENCOUNTER — TELEPHONE (OUTPATIENT)
Facility: CLINIC | Age: 44
End: 2024-10-07

## 2024-10-07 ENCOUNTER — LAB ENCOUNTER (OUTPATIENT)
Dept: LAB | Age: 44
End: 2024-10-07
Attending: STUDENT IN AN ORGANIZED HEALTH CARE EDUCATION/TRAINING PROGRAM
Payer: MEDICAID

## 2024-10-07 ENCOUNTER — OFFICE VISIT (OUTPATIENT)
Facility: CLINIC | Age: 44
End: 2024-10-07
Payer: MEDICAID

## 2024-10-07 VITALS
HEIGHT: 77 IN | BODY MASS INDEX: 28.81 KG/M2 | DIASTOLIC BLOOD PRESSURE: 64 MMHG | OXYGEN SATURATION: 97 % | WEIGHT: 244 LBS | SYSTOLIC BLOOD PRESSURE: 122 MMHG | HEART RATE: 69 BPM

## 2024-10-07 DIAGNOSIS — G89.29 CHRONIC MIDLINE LOW BACK PAIN WITHOUT SCIATICA: ICD-10-CM

## 2024-10-07 DIAGNOSIS — R56.9 FIRST TIME SEIZURE (HCC): ICD-10-CM

## 2024-10-07 DIAGNOSIS — M54.50 CHRONIC MIDLINE LOW BACK PAIN WITHOUT SCIATICA: ICD-10-CM

## 2024-10-07 DIAGNOSIS — E10.9 TYPE 1 DIABETES MELLITUS WITHOUT COMPLICATION (HCC): Primary | ICD-10-CM

## 2024-10-07 DIAGNOSIS — G47.50 PARASOMNIA: ICD-10-CM

## 2024-10-07 DIAGNOSIS — E10.9 TYPE 1 DIABETES MELLITUS WITHOUT COMPLICATION (HCC): ICD-10-CM

## 2024-10-07 LAB
CHOLEST SERPL-MCNC: 145 MG/DL (ref ?–200)
CREAT UR-SCNC: 161.9 MG/DL
FASTING PATIENT LIPID ANSWER: NO
HDLC SERPL-MCNC: 40 MG/DL (ref 40–59)
LDLC SERPL CALC-MCNC: 86 MG/DL (ref ?–100)
MICROALBUMIN UR-MCNC: 0.5 MG/DL
MICROALBUMIN/CREAT 24H UR-RTO: 3.1 UG/MG (ref ?–30)
NONHDLC SERPL-MCNC: 105 MG/DL (ref ?–130)
TRIGL SERPL-MCNC: 101 MG/DL (ref 30–149)
VLDLC SERPL CALC-MCNC: 16 MG/DL (ref 0–30)

## 2024-10-07 PROCEDURE — 36415 COLL VENOUS BLD VENIPUNCTURE: CPT

## 2024-10-07 PROCEDURE — 80061 LIPID PANEL: CPT

## 2024-10-07 PROCEDURE — 99204 OFFICE O/P NEW MOD 45 MIN: CPT | Performed by: STUDENT IN AN ORGANIZED HEALTH CARE EDUCATION/TRAINING PROGRAM

## 2024-10-07 PROCEDURE — 95249 CONT GLUC MNTR PT PROV EQP: CPT | Performed by: STUDENT IN AN ORGANIZED HEALTH CARE EDUCATION/TRAINING PROGRAM

## 2024-10-07 PROCEDURE — 82043 UR ALBUMIN QUANTITATIVE: CPT

## 2024-10-07 PROCEDURE — 82570 ASSAY OF URINE CREATININE: CPT

## 2024-10-07 RX ORDER — ACYCLOVIR 400 MG/1
1 TABLET ORAL
Qty: 10 EACH | Refills: 1 | Status: SHIPPED | OUTPATIENT
Start: 2024-10-07

## 2024-10-07 RX ORDER — INSULIN LISPRO 100 [IU]/ML
INJECTION, SOLUTION INTRAVENOUS; SUBCUTANEOUS
Qty: 5 EACH | Refills: 3 | Status: SHIPPED | OUTPATIENT
Start: 2024-10-07 | End: 2024-10-07

## 2024-10-07 RX ORDER — GABAPENTIN 300 MG/1
300 CAPSULE ORAL 3 TIMES DAILY
Qty: 90 CAPSULE | Refills: 0 | Status: SHIPPED | OUTPATIENT
Start: 2024-10-07 | End: 2024-11-06

## 2024-10-07 RX ORDER — INSULIN LISPRO 100 [IU]/ML
INJECTION, SOLUTION INTRAVENOUS; SUBCUTANEOUS
Qty: 5 EACH | Refills: 3 | Status: SHIPPED | OUTPATIENT
Start: 2024-10-07

## 2024-10-07 RX ORDER — ATORVASTATIN CALCIUM 20 MG/1
20 TABLET, FILM COATED ORAL NIGHTLY
COMMUNITY

## 2024-10-07 RX ORDER — PEN NEEDLE, DIABETIC 32GX 5/32"
NEEDLE, DISPOSABLE MISCELLANEOUS
Qty: 100 EACH | Refills: 6 | Status: SHIPPED | OUTPATIENT
Start: 2024-10-07

## 2024-10-07 NOTE — TELEPHONE ENCOUNTER
Pt phoned and stated that he was to call back with what he was taking for his fast acting insulin.  Pt stated that he is taking Insulin Lispro 1 Unit Dial, (HUMALOG KWIKPEN) 100 UNIT/ML.    Pt also stated that he will need a refill on this medication.  Informed pt that I will give Dr. Johnson this message and his request for refill.  Pt verbalized understanding.

## 2024-10-07 NOTE — TELEPHONE ENCOUNTER
Pts pharmacy phoned clinic and stated that they will need a \"Max daily dose\" for script Insulin Lispro, 1 Unit Dial, (HUMALOG KWIKPEN) 100 UNIT/ML.  Informed pharmacy that I will send this back to DO for a new script with that information to be included.    Pharmacy verbalized understanding.

## 2024-10-07 NOTE — H&P
[facility]    Name: Rambo Freitas    Date: 10/7/2024    Rambo Freitas is a 44 year old male who presents for evaluation of T1DM management.     Chief complaint: Diabetes (Pt here today for HFU /Type 1 DM - Last A1C 8.7 9/29/24/Eye Exam due/Foot Exam c/o burning and numbness )       Subjective:   Initial HPI consult - 10/7/2024  Pt was seen by Kyra Espinoza 10/1/2024 when he was admitted with uncontrolled T1/AV DM  Per review: Upon interview today, patient states he was originally diagnosed with T2DM while incarcerated and was treated with metformin; since that time, he was seen by a PCP while incarcerated who performed antibody testing and diagnosed him with AV/T1 DM. Since then, he has been on basal/bolus infusion without issue. The patient states, since discharge from jail mid-September, he lives alone and has returned to a construction job. He states compliance with his basal/bolus insulin regimen prior to his ETOH binge that caused admission.      DM hx:  -Diagnosed with diabetes in 2021 as at T2DM, started on metformin/glipiizde. Did not note improvement in BG; started on Lantus and Novolog   -Family history- yes, strong diabetes family hx  ; father, grandparents with T2DM   -Re: potential DM medication contraindication (if positive, checkbox selected):  [] History of pancreatitis  [] Personal/fam hx of medullary thyroid cancer/MEN2  [] History of recent/frequent UTI/yeast infxn  [] Previous amputation related to diabetes    -Presence of associated DM complications (if positive, checkbox selected):  [] Macrovascular complications (CAD/CVA/PAD)  [x] Neuropathy  [] Retinopathy  [] Nephropathy  [] HTN  [x] Hyperlipidemia  [] Stroke/TIA  [] Gastroparesis  [] Wound, ulcer or amputations    - Lifestyle: works in construction     DM meds at first office visit: Lantus 34 units at bedtime and Novolog 4u with 1:50>150 twice daily (not particularly with meals) --> Lantus 24 units at bedtime  and Novolog 1:30 over 130 with meals     Continuous Glucose Monitoring Interpretation  Rambo Freitas has undergone continuous glucose monitoring with the Dexcom G7 CGM with phone (connected to clinic profile).  The blood glucose tracings were evaluated for two weeks prior to office visit. Blood glucose tracings demonstrated areas of hyperglycemia from 9A to 12A.. There were no areas of hypoglycemia notedduring the weeks of evaluation.           Previously trialed/failed DM meds: metformin and glipizide (discontinue after diagnosis of AV)    History/Other:    Allergies, PMH, SocHx and FHx reviewed and updated as appropriate in Epic on    gabapentin 300 MG Oral Cap Take 1 capsule (300 mg total) by mouth 2 (two) times daily. 300 mg in am, 600 mg in pm      pantoprazole 40 MG Oral Tab EC Take 1 tablet (40 mg total) by mouth 2 (two) times daily before meals. 60 tablet 1    insulin glargine (LANTUS SOLOSTAR) 100 UNIT/ML Subcutaneous Solution Pen-injector Inject 25 Units into the skin nightly. 5 each 3    Insulin Lispro, 1 Unit Dial, (HUMALOG KWIKPEN) 100 UNIT/ML Subcutaneous Solution Pen-injector Test blood glucose 3 times daily before meals. Inject 1 unit of insulin for every 30 points of glucose is greater than 140mg/dL. 5 each 3    Blood Glucose Monitoring Suppl (ONETOUCH VERIO FLEX SYSTEM) w/Device Does not apply Kit Test blood sugar 3 times per day. 1 kit 0    Glucose Blood (ONETOUCH VERIO) In Vitro Strip Test blood sugar 3 times per day. 100 strip 6    OneTouch Delica Lancets 33G Does not apply Misc Test blood sugar 3 times per day. 100 each 6    Insulin Pen Needle (BD PEN NEEDLE SARITHA U/F) 32G X 4 MM Does not apply Misc Inject 3 times per day. Use a new pen needle with each injection 100 each 6    albuterol 108 (90 Base) MCG/ACT Inhalation Aero Soln Inhale 2 puffs into the lungs every 6 (six) hours as needed for Wheezing.      SYMBICORT 160-4.5 MCG/ACT Inhalation Aerosol INHALE 2 PUFFS BY MOUTH INTO THE LUNGS  TWICE DAILY 30.6 g 0     No Known Allergies  Current Outpatient Medications   Medication Sig Dispense Refill    gabapentin 300 MG Oral Cap Take 1 capsule (300 mg total) by mouth 2 (two) times daily. 300 mg in am, 600 mg in pm      pantoprazole 40 MG Oral Tab EC Take 1 tablet (40 mg total) by mouth 2 (two) times daily before meals. 60 tablet 1    insulin glargine (LANTUS SOLOSTAR) 100 UNIT/ML Subcutaneous Solution Pen-injector Inject 25 Units into the skin nightly. 5 each 3    Insulin Lispro, 1 Unit Dial, (HUMALOG KWIKPEN) 100 UNIT/ML Subcutaneous Solution Pen-injector Test blood glucose 3 times daily before meals. Inject 1 unit of insulin for every 30 points of glucose is greater than 140mg/dL. 5 each 3    Blood Glucose Monitoring Suppl (ONETOUCH VERIO FLEX SYSTEM) w/Device Does not apply Kit Test blood sugar 3 times per day. 1 kit 0    Glucose Blood (ONETOUCH VERIO) In Vitro Strip Test blood sugar 3 times per day. 100 strip 6    OneTouch Delica Lancets 33G Does not apply Misc Test blood sugar 3 times per day. 100 each 6    Insulin Pen Needle (BD PEN NEEDLE SARITHA U/F) 32G X 4 MM Does not apply Misc Inject 3 times per day. Use a new pen needle with each injection 100 each 6    albuterol 108 (90 Base) MCG/ACT Inhalation Aero Soln Inhale 2 puffs into the lungs every 6 (six) hours as needed for Wheezing.      SYMBICORT 160-4.5 MCG/ACT Inhalation Aerosol INHALE 2 PUFFS BY MOUTH INTO THE LUNGS TWICE DAILY 30.6 g 0     Past Medical History:    Asthma (HCC)    Back pain    2 bulging disks    Bilateral hearing loss, unspecified hearing loss type    Exercise-induced asthma (HCC)    Mitral valve prolapse    has been checked for heart problems and arrythmia at [place] (about )    Rib fracture    left side    Sleep walking    Type 1 diabetes mellitus (HCC)     Family History   Problem Relation Age of Onset    Breast Cancer Mother     Heart Disease Father         pacemaker    Heart Disease Sister         pacemaker      Heart Disease Paternal Aunt         pacemaker    Heart Disease Paternal Aunt         pacemaker     Social history: Reviewed.      ROS/Exam    REVIEW OF SYSTEMS: Ten point review of systems has been performed and is otherwise negative and/or non-contributory, except as described above.     VITALS  Vitals:    10/07/24 0909   BP: 122/64   Pulse: 69   SpO2: 97%   Weight: 244 lb (110.7 kg)   Height: 6' 5\" (1.956 m)       Wt Readings from Last 6 Encounters:   10/07/24 244 lb (110.7 kg)   10/02/24 244 lb 7.8 oz (110.9 kg)   09/25/24 230 lb (104.3 kg)   09/09/20 202 lb 6.4 oz (91.8 kg)   09/08/20 160 lb (72.6 kg)   01/16/20 205 lb (93 kg)       PHYSICAL EXAM  CONSTITUTIONAL:  awake, alert, cooperative, no apparent distress, and appears stated age    PSYCH: normal affect  LUNGS: breathing comfortably  CARDIOVASCULAR:  regular rate   NECK:  no palpable thyroid nodules      Labs/Imaging: Pertinent imaging reviewed.    Overall glucose control:  Lab Results   Component Value Date    A1C 8.7 (H) 09/29/2024    A1C 5.2 12/02/2019       Supplementary Documentation:   -Surveillance for Diabetes Complications & Risks  Foot exam/neuropathy: No data recorded  Retinopathy screening: No data recordedNo data recorded    Assessment & Plan:     ICD-10-CM    1. Type 1 diabetes mellitus without complication (HCC)  E10.9 Lipid Panel [E]     Microalb/Creat Ratio, Random Urine [E]          Rambo Freitas is a pleasant 44 year old male here for evaluation of:    #Diabetes- PMHx of Type 1 diabetes mellitus diagnosed in 2021.        Last A1c value was 8.7% done 9/29/2024.  Goal <7%. Importance of better glucose control in preventing onset/progression of end-organ damage discussed, as well as goals of therapy and clinical significance of A1C. We reviewed CGM data and patient with pp hyperglycemia without any hypoglycemia   Current regimen:   Lantus 34 units at bedtime and Novolog 4u with 1:50>150 twice daily (not particularly with meals) --> Lantus  34 units at bedtime and Novolog 1:30 over 130 with meals      - med changes: Continue Lantus 34 units at bedtime and start Novolog 4u TID with meals with sliding scale of 1 unit for every 30 points above 130.  Patient to confirm if he was previously taking mixed insulin twice daily  - Continue baby ASA    - continue to check BG 3x/day using glucometer or CGM  - meet with endo DM educator re:Pump 101  - Discussed adding GLP-1 to current medication regimen, including action, risk vs benefit, dosing, and potential side effects. Patient denies hx of pancreatitis, gastroparesis, or personal or family hx of medullary thyroid CA or MEN 2.   Patient would like to hold off at this time.  -See above header \"Supplementary Documentation\" for surveillance for diabetes complications & risks    #Nephropathy screening/CKD:   Lab Results   Component Value Date    EGFRCR 109 10/02/2024      #Blood pressure control: SBP is to goal <130   BP Readings from Last 1 Encounters:   10/07/24 122/64   BP Meds:      #Hyperlipidemia/Lipids: LDL is not to goal ; due for repeat  Lab Results   Component Value Date     12/10/2019    TRIG 134.00 12/10/2019   Cholesterol Meds:  Atorvastatin 20 mg          Return in about 3 months (around 1/7/2025) for 6 weeks with APN and 3 months with Physician .    10/7/2024  Rianna Johnson, DO    Note to patient: The 21 Century Cures Act makes medical notes like these available to patients in the interest of transparency. However, be advised this is a medical document. It is intended as peer to peer communication. It is written in medical language and may contain abbreviations or verbiage that are unfamiliar. It may appear blunt or direct. Medical documents are intended to carry relevant information, facts as evident, and the clinical opinion of the practitioner.

## 2024-10-07 NOTE — PATIENT INSTRUCTIONS
Return Visit   [ x ] Physician in 3 months, Alize/Terri in 6 weeks   [  ] In person or video visit  [  ] In person only    [ x ] After visit summary   [ x ] Placed labs/imaging. Labs are to be drawn at 8A and fasting.    [ x ] Diabetes Education:     [ X ] Pump 101 (60)       -Please give our clinic fax number to your ophthalmologist so they can fax us your eye visit records  -Complete labs prior to follow up visit  -Please continue Lantus 34 units at bedtime and start Humalog 4 units prior to meals with a 1:30 over 130 correction      -Dr. Johnson

## 2024-10-09 ENCOUNTER — NURSE ONLY (OUTPATIENT)
Facility: CLINIC | Age: 44
End: 2024-10-09
Payer: MEDICAID

## 2024-10-09 DIAGNOSIS — E10.9 TYPE 1 DIABETES MELLITUS WITHOUT COMPLICATION (HCC): Primary | ICD-10-CM

## 2024-10-09 PROCEDURE — 99211 OFF/OP EST MAY X REQ PHY/QHP: CPT | Performed by: STUDENT IN AN ORGANIZED HEALTH CARE EDUCATION/TRAINING PROGRAM

## 2024-10-09 NOTE — PROGRESS NOTES
Insulin Pump Therapy 101    Rambo Freitas  3/19/1980    Referred by: Rianna Johnson DO     Patient Concerns     - Would like a non tubed pump  - Not exited about needing to continue carb counting     Education     Discussion included:  - Overview of Medtronic, Omnipod and Tandem pump options, and iLet Bionic Pancreas  - Basal / Bolus insulin  - Automated Delivery System operations  - Pros and Cons of each device  - CGM integration  - Social and scientific aspects which might effect choice  - Insurance and coverage considerations of each device      Patient most interested in mobi or op5, would like to continue on MDI for now and hear about new pumps later in the year.      Future Appointments   Date Time Provider Department Center   10/9/2024  9:15 AM [facility] DIABETIC EDUCATOR ENDO [facility]   10/25/2024  1:00 PM Juan M Mosher DO [facility] [address]   11/18/2024  8:30 AM Elizabeth Sears APN [facility]   1/13/2025  8:30 AM Rianna Johnson DO [id number] [facility]        10/9/2024  Narcisa Smith RN, MSN, BC-ADM, [facility]  Diabetes Care &        A total of 50 minutes was spent with the patient including chart review, discussion and education / advisement pertinent to patient and provider specified concerns as documented above.     Note to patient: The 21 Century Cures Act makes medical notes like these available to patients in the interest of transparency. However, be advised this is a medical document. It is intended as peer to peer communication. It is written in medical language and may contain abbreviations or verbiage that are unfamiliar. It may appear blunt or direct. Medical documents are intended to carry relevant information, facts as evident, and the clinical opinion of the practitioner.

## 2024-10-21 NOTE — PROGRESS NOTES
Multiple attempts to reach the pt and messages left with no returned phone call. Past recommended HFU timeframe, closing encounter,

## 2024-10-25 ENCOUNTER — OFFICE VISIT (OUTPATIENT)
Dept: FAMILY MEDICINE CLINIC | Facility: CLINIC | Age: 44
End: 2024-10-25
Payer: MEDICAID

## 2024-10-25 VITALS
BODY MASS INDEX: 25.62 KG/M2 | WEIGHT: 217 LBS | HEART RATE: 75 BPM | SYSTOLIC BLOOD PRESSURE: 130 MMHG | DIASTOLIC BLOOD PRESSURE: 70 MMHG | HEIGHT: 77 IN

## 2024-10-25 DIAGNOSIS — R10.13 EPIGASTRIC ABDOMINAL PAIN: ICD-10-CM

## 2024-10-25 DIAGNOSIS — M54.50 CHRONIC MIDLINE LOW BACK PAIN WITHOUT SCIATICA: ICD-10-CM

## 2024-10-25 DIAGNOSIS — G89.29 CHRONIC MIDLINE LOW BACK PAIN WITHOUT SCIATICA: ICD-10-CM

## 2024-10-25 DIAGNOSIS — N17.9 AKI (ACUTE KIDNEY INJURY) (HCC): Primary | ICD-10-CM

## 2024-10-25 DIAGNOSIS — Z12.11 COLON CANCER SCREENING: ICD-10-CM

## 2024-10-25 DIAGNOSIS — E10.9 TYPE 1 DIABETES MELLITUS WITHOUT COMPLICATION (HCC): ICD-10-CM

## 2024-10-25 DIAGNOSIS — R56.9 FIRST TIME SEIZURE (HCC): ICD-10-CM

## 2024-10-25 DIAGNOSIS — G47.50 PARASOMNIA: ICD-10-CM

## 2024-10-25 DIAGNOSIS — F10.10 ALCOHOL ABUSE: ICD-10-CM

## 2024-10-25 PROCEDURE — 90471 IMMUNIZATION ADMIN: CPT | Performed by: FAMILY MEDICINE

## 2024-10-25 PROCEDURE — 90656 IIV3 VACC NO PRSV 0.5 ML IM: CPT | Performed by: FAMILY MEDICINE

## 2024-10-25 PROCEDURE — 99214 OFFICE O/P EST MOD 30 MIN: CPT | Performed by: FAMILY MEDICINE

## 2024-10-25 RX ORDER — DULOXETIN HYDROCHLORIDE 20 MG/1
20 CAPSULE, DELAYED RELEASE ORAL NIGHTLY
COMMUNITY
Start: 2024-10-04 | End: 2024-10-25

## 2024-10-25 RX ORDER — ATORVASTATIN CALCIUM 20 MG/1
20 TABLET, FILM COATED ORAL NIGHTLY
Qty: 90 TABLET | Refills: 1 | Status: SHIPPED | OUTPATIENT
Start: 2024-10-25

## 2024-10-25 RX ORDER — AMOXICILLIN 500 MG/1
500 CAPSULE ORAL 3 TIMES DAILY
COMMUNITY
Start: 2024-10-05 | End: 2024-10-25

## 2024-10-25 RX ORDER — LISINOPRIL 10 MG/1
10 TABLET ORAL DAILY
COMMUNITY
Start: 2024-10-04

## 2024-10-25 RX ORDER — HYDROCORTISONE 25 MG/G
CREAM TOPICAL
COMMUNITY
Start: 2024-10-16

## 2024-10-25 RX ORDER — IBUPROFEN 600 MG/1
TABLET, FILM COATED ORAL
COMMUNITY
Start: 2024-10-05

## 2024-10-25 RX ORDER — GABAPENTIN 300 MG/1
300 CAPSULE ORAL 3 TIMES DAILY
Qty: 90 CAPSULE | Refills: 0 | Status: SHIPPED | OUTPATIENT
Start: 2024-10-25 | End: 2024-11-24

## 2024-10-25 RX ORDER — PANTOPRAZOLE SODIUM 20 MG/1
20 TABLET, DELAYED RELEASE ORAL
Qty: 180 TABLET | Refills: 1 | Status: SHIPPED | OUTPATIENT
Start: 2024-10-25

## 2024-10-25 RX ORDER — TRAMADOL HYDROCHLORIDE 50 MG/1
50 TABLET ORAL EVERY 8 HOURS PRN
Qty: 60 TABLET | Refills: 0 | Status: SHIPPED | OUTPATIENT
Start: 2024-10-25

## 2024-10-25 RX ORDER — ASPIRIN 81 MG/1
TABLET ORAL
COMMUNITY
Start: 2024-10-18

## 2024-10-25 NOTE — PROGRESS NOTES
Subjective:   Rambo Freitas is a 44 year old male who presents for hospital follow up.   He was discharged from [facility] to Home or Self Care  Admission Date: 9/29/24   Discharge Date: 10/2/24  Hospital Discharge Diagnosis: #NAKUL   #Hyponatremia  #Metabolic acidosis due to alcohol ketoacidosis and dehydration  #Alcohol abuse  #Elevated LFT  #DM type I  #coffee ground emesis    Interactive contact within 2 business days post discharge first initiated on Date: 10/3/2024    During the visit, the following was completed:  Obtained and reviewed discharge summary, continuity of care documents, and Hospitalist notes  Reviewed Labs (CBC, CMP) and EKG    HPI: 45 yo male with PMHx of DM type 1, asthma, MVP who had been having n/v.  Coffee ground emesis.  Had been drinking heavy 5 days before getting to ED.  Found to have NAKUL, dehydration, alcoholic ketoacidosis.  Before that was 4 years sober.    History/Other:   Current Medications:  Medication Reconciliation:  I am aware of an inpatient discharge within the last 30 days.  The discharge medication list has been reconciled with the patient's current medication list and reviewed by me.  See medication list for additions of new medication, and changes to current doses of medications and discontinued medications.  Outpatient Medications Marked as Taking for the 10/25/24 encounter (Office Visit) with Juan M Mosher, DO   Medication Sig    aspirin 81 MG Oral Tab EC aspirin 81 mg tablet,delayed release, [RxNorm: 937248]    hydrocortisone 2.5 % External Cream hydrocortisone 2.5 % External Cream, [RxNorm: 905167]    ibuprofen 600 MG Oral Tab TAKE 1 TABLET BY MOUTH EVERY 6 HOURS AS NEEDED FOR PAIN FOR 7 DAYS    lisinopril 10 MG Oral Tab Take 1 tablet (10 mg total) by mouth daily.    pantoprazole 20 MG Oral Tab EC Take 1 tablet (20 mg total) by mouth 2 (two) times daily before meals.    gabapentin 300 MG Oral Cap Take 1 capsule (300 mg total) by mouth in the morning, at noon,  and at bedtime. 300 mg in am, 600 mg in pm    atorvastatin 20 MG Oral Tab Take 1 tablet (20 mg total) by mouth nightly.    traMADol 50 MG Oral Tab Take 1 tablet (50 mg total) by mouth every 8 (eight) hours as needed for Pain.    Insulin Pen Needle (BD PEN NEEDLE SARITHA U/F) 32G X 4 MM Does not apply Misc Inject 3 times per day. Use a new pen needle with each injection    Continuous Glucose Sensor (DEXCOM G7 SENSOR) Does not apply Misc 1 each Every 10 days.    Insulin Lispro, 1 Unit Dial, (HUMALOG KWIKPEN) 100 UNIT/ML Subcutaneous Solution Pen-injector Test blood glucose 3 times daily before meals. Inject 4 units prior to meals and 1 unit of insulin for every 30 points of glucose is greater than 130mg/dL. MDD of 30 units    insulin glargine (LANTUS SOLOSTAR) 100 UNIT/ML Subcutaneous Solution Pen-injector Inject 25 Units into the skin nightly.    Blood Glucose Monitoring Suppl (ONETOUCH VERIO FLEX SYSTEM) w/Device Does not apply Kit Test blood sugar 3 times per day.    Glucose Blood (ONETOUCH VERIO) In Vitro Strip Test blood sugar 3 times per day.    OneTouch Delica Lancets 33G Does not apply Misc Test blood sugar 3 times per day.    albuterol 108 (90 Base) MCG/ACT Inhalation Aero Soln Inhale 2 puffs into the lungs every 6 (six) hours as needed for Wheezing.    SYMBICORT 160-4.5 MCG/ACT Inhalation Aerosol INHALE 2 PUFFS BY MOUTH INTO THE LUNGS TWICE DAILY       Review of Systems:  GENERAL: weight stable, energy stable, no sweating  SKIN: denies any unusual skin lesions  EYES: denies blurred vision or double vision  HEENT: denies nasal congestion, sinus pain or ST  LUNGS: denies shortness of breath with exertion  CARDIOVASCULAR: denies chest pain on exertion or palpitations  GI: denies abdominal pain, denies heartburn, denies diarrhea  MUSCULOSKELETAL: denies pain, normal range of motion of extremities  NEURO: denies headaches, denies dizziness, denies weakness  PSYCHE: denies depression or anxiety  HEMATOLOGIC: denies  hx of anemia, or bruising, denies bleeding  ENDOCRINE: denies thyroid history  ALL/ASTHMA: denies hx of allergy or asthma    Objective:   No LMP for male patient.  Estimated body mass index is 25.73 kg/m² as calculated from the following:    Height as of this encounter: 6' 5\" (1.956 m).    Weight as of this encounter: 217 lb (98.4 kg).   /70   Pulse 75   Ht 6' 5\" (1.956 m)   Wt 217 lb (98.4 kg)   BMI 25.73 kg/m²    GENERAL: well developed, well nourished, in no apparent distress  SKIN: no rashes, no suspicious lesions  HEENT: atraumatic, normocephalic, ears and throat are clear  EYES: PERRLA, EOMI, conjunctiva are clear  NECK: supple, no adenopathy, no bruits  CHEST: no chest tenderness  LUNGS: clear to auscultation  CARDIO: RRR without murmur  GI: good BS's, no masses, HSM or tenderness  MUSCULOSKELETAL: back is not tender, FROM of the extremities  EXTREMITIES: no cyanosis, clubbing or edema  NEURO: Oriented times three, cranial nerves are intact, motor and sensory are grossly intact    Assessment & Plan:   1. NAKUL (acute kidney injury) (HCC) (Primary)  -     CBC With Differential With Platelet; Future; Expected date: 10/25/2024  -     Comp Metabolic Panel (14); Future; Expected date: 10/25/2024  2. Alcohol abuse  -     Vitamin D; Future; Expected date: 10/25/2024  -     Vitamin B12; Future; Expected date: 10/25/2024  -     Vitamin B1 (Thiamine), Blood; Future; Expected date: 10/25/2024  3. Epigastric abdominal pain  -     Gastro Referral - In Network  -     Pantoprazole Sodium; Take 1 tablet (20 mg total) by mouth 2 (two) times daily before meals.  Dispense: 180 tablet; Refill: 1  4. Colon cancer screening  -     Gastro Referral - In Network  5. Parasomnia  -     Gabapentin; Take 1 capsule (300 mg total) by mouth in the morning, at noon, and at bedtime. 300 mg in am, 600 mg in pm  Dispense: 90 capsule; Refill: 0  6. First time seizure (HCC)  -     Gabapentin; Take 1 capsule (300 mg total) by mouth in the  morning, at noon, and at bedtime. 300 mg in am, 600 mg in pm  Dispense: 90 capsule; Refill: 0  7. Chronic midline low back pain without sciatica  -     Gabapentin; Take 1 capsule (300 mg total) by mouth in the morning, at noon, and at bedtime. 300 mg in am, 600 mg in pm  Dispense: 90 capsule; Refill: 0  -     traMADol HCl; Take 1 tablet (50 mg total) by mouth every 8 (eight) hours as needed for Pain.  Dispense: 60 tablet; Refill: 0  -     Pain Management Referral - In Network  -     OP REFERRAL TO [facility]  -     MRI SPINE LUMBAR (CPT=72148); Future; Expected date: 10/25/2024  8. Type 1 diabetes mellitus without complication (HCC)  -     Atorvastatin Calcium; Take 1 tablet (20 mg total) by mouth nightly.  Dispense: 90 tablet; Refill: 1  Other orders  -     Fluzone trivalent vaccine, PF 0.5mL, 6mo+ (30288)        Return in about 3 months (around 1/25/2025).

## 2024-11-27 DIAGNOSIS — R56.9 FIRST TIME SEIZURE (HCC): ICD-10-CM

## 2024-11-27 DIAGNOSIS — M54.50 CHRONIC MIDLINE LOW BACK PAIN WITHOUT SCIATICA: ICD-10-CM

## 2024-11-27 DIAGNOSIS — G47.50 PARASOMNIA: ICD-10-CM

## 2024-11-27 DIAGNOSIS — G89.29 CHRONIC MIDLINE LOW BACK PAIN WITHOUT SCIATICA: ICD-10-CM

## 2024-11-27 RX ORDER — GABAPENTIN 300 MG/1
CAPSULE ORAL
Qty: 90 CAPSULE | Refills: 0 | OUTPATIENT
Start: 2024-11-27

## 2024-11-27 NOTE — TELEPHONE ENCOUNTER
RN phoned patient and reviewed to follow up with PCP for Gabapentin refill    Patient verbalized understanding.    Refill denied.

## 2024-11-29 RX ORDER — GABAPENTIN 300 MG/1
300 CAPSULE ORAL 3 TIMES DAILY
Qty: 90 CAPSULE | Refills: 0 | Status: SHIPPED | OUTPATIENT
Start: 2024-11-29

## 2024-12-12 ENCOUNTER — HOSPITAL ENCOUNTER (OUTPATIENT)
Facility: HOSPITAL | Age: 44
Setting detail: HOSPITAL OUTPATIENT SURGERY
Discharge: HOME | End: 2024-12-12
Attending: INTERNAL MEDICINE | Admitting: INTERNAL MEDICINE
Payer: MEDICAID

## 2024-12-12 ENCOUNTER — ANESTHESIA (OUTPATIENT)
Dept: ENDOSCOPY | Facility: HOSPITAL | Age: 44
End: 2024-12-12
Payer: MEDICAID

## 2024-12-12 ENCOUNTER — ANESTHESIA EVENT (OUTPATIENT)
Dept: ENDOSCOPY | Facility: HOSPITAL | Age: 44
End: 2024-12-12
Payer: MEDICAID

## 2024-12-12 VITALS
OXYGEN SATURATION: 96 % | DIASTOLIC BLOOD PRESSURE: 96 MMHG | WEIGHT: 217 LBS | SYSTOLIC BLOOD PRESSURE: 127 MMHG | TEMPERATURE: 98 F | HEART RATE: 58 BPM | HEIGHT: 77 IN | RESPIRATION RATE: 18 BRPM | BODY MASS INDEX: 25.62 KG/M2

## 2024-12-12 LAB — GLUCOSE BLD-MCNC: 199 MG/DL (ref 70–99)

## 2024-12-12 PROCEDURE — 88305 TISSUE EXAM BY PATHOLOGIST: CPT | Performed by: INTERNAL MEDICINE

## 2024-12-12 PROCEDURE — 82962 GLUCOSE BLOOD TEST: CPT

## 2024-12-12 PROCEDURE — 0DBN8ZX EXCISION OF SIGMOID COLON, VIA NATURAL OR ARTIFICIAL OPENING ENDOSCOPIC, DIAGNOSTIC: ICD-10-PCS | Performed by: INTERNAL MEDICINE

## 2024-12-12 PROCEDURE — 0DBK8ZX EXCISION OF ASCENDING COLON, VIA NATURAL OR ARTIFICIAL OPENING ENDOSCOPIC, DIAGNOSTIC: ICD-10-PCS | Performed by: INTERNAL MEDICINE

## 2024-12-12 PROCEDURE — 0DJ08ZZ INSPECTION OF UPPER INTESTINAL TRACT, VIA NATURAL OR ARTIFICIAL OPENING ENDOSCOPIC: ICD-10-PCS | Performed by: INTERNAL MEDICINE

## 2024-12-12 RX ORDER — LIDOCAINE HYDROCHLORIDE 10 MG/ML
INJECTION, SOLUTION EPIDURAL; INFILTRATION; INTRACAUDAL; PERINEURAL AS NEEDED
Status: DISCONTINUED | OUTPATIENT
Start: 2024-12-12 | End: 2024-12-12 | Stop reason: SURG

## 2024-12-12 RX ORDER — SODIUM CHLORIDE, SODIUM LACTATE, POTASSIUM CHLORIDE, CALCIUM CHLORIDE 600; 310; 30; 20 MG/100ML; MG/100ML; MG/100ML; MG/100ML
INJECTION, SOLUTION INTRAVENOUS CONTINUOUS
Status: DISCONTINUED | OUTPATIENT
Start: 2024-12-12 | End: 2024-12-12

## 2024-12-12 RX ORDER — NICOTINE POLACRILEX 4 MG
15 LOZENGE BUCCAL
Status: DISCONTINUED | OUTPATIENT
Start: 2024-12-12 | End: 2024-12-12

## 2024-12-12 RX ORDER — NICOTINE POLACRILEX 4 MG
30 LOZENGE BUCCAL
Status: DISCONTINUED | OUTPATIENT
Start: 2024-12-12 | End: 2024-12-12

## 2024-12-12 RX ORDER — DEXTROSE MONOHYDRATE 25 G/50ML
50 INJECTION, SOLUTION INTRAVENOUS
Status: DISCONTINUED | OUTPATIENT
Start: 2024-12-12 | End: 2024-12-12

## 2024-12-12 RX ORDER — NALOXONE HYDROCHLORIDE 0.4 MG/ML
0.08 INJECTION, SOLUTION INTRAMUSCULAR; INTRAVENOUS; SUBCUTANEOUS AS NEEDED
Status: DISCONTINUED | OUTPATIENT
Start: 2024-12-12 | End: 2024-12-12

## 2024-12-12 RX ADMIN — LIDOCAINE HYDROCHLORIDE 100 MG: 10 INJECTION, SOLUTION EPIDURAL; INFILTRATION; INTRACAUDAL; PERINEURAL at 08:23:00

## 2024-12-12 NOTE — ANESTHESIA POSTPROCEDURE EVALUATION
[facility]    Rambo Kennedyytnanciych Patient Status:  Hospital Outpatient Surgery   Age/Gender 44 year old male MRN [id number]   Location [facility] ENDOSCOPY [facility] Attending Manuel Obrien MD   Hosp Day # 0 PCP None Pcp       Anesthesia Post-op Note    ESOPHAGOGASTRODUODENOSCOPY (EGD),  COLONOSCOPY WITH COLD SNARE POLYPECTOMY    Procedure Summary       Date: 12/12/24 Room / Location:  ENDOSCOPY 04 /  ENDOSCOPY    Anesthesia Start: 0820 Anesthesia Stop: 0851    Procedures:       ESOPHAGOGASTRODUODENOSCOPY (EGD), COLONOSCOPY WITH COLD SNARE POLYPECTOMY      COLONOSCOPY Diagnosis: (EGD-NORMAL   COLON-POLYPS)    Surgeons: Manuel Obrien MD Anesthesiologist: Marlon Mc MD    Anesthesia Type: MAC ASA Status: 2            Anesthesia Type: MAC    Vitals Value Taken Time   /57 12/12/24 0855   Temp 98F 12/12/24 0900   Pulse 46 12/12/24 0858   Resp 16 12/12/24 0900   SpO2 98 % 12/12/24 0858   Vitals shown include unfiled device data.    Patient Location: Endoscopy    Anesthesia Type: MAC    Airway Patency: patent    Postop Pain Control: adequate    Mental Status: preanesthetic baseline    Nausea/Vomiting: none    Cardiopulmonary/Hydration status: stable euvolemic    Complications: no apparent anesthesia related complications    Postop vital signs: stable    Dental Exam: Unchanged from Preop    Patient to be discharged home when criteria met.

## 2024-12-12 NOTE — H&P
[facility]-Gastroenterology     Rambo Freitas  [id number]  3/19/1980     No ref. provider found     Chief Complaint and History of Present Illness:      No chief complaint on file.        Patient is a 44 year old male with PMH of Seizures, mitral valve prolapse, and GERD who presents today for hospital follow up.      Admitted to [facility] ER 9/29/2024 - 10/2/2024 with nausea and vomiting coffee ground emesis. Underwent EGD with Dr. Obrien 9/30/2024 and was found to have severe esophagitis with confluent ulceration of the distal esophagus and a 2-3 cm hiatal hernia. Biopsies showed peptic duodenitis. Recommended to use Pantoprazole 40 mg twice daily for 4 weeks and then decrease to once daily. Recommended to repeat EGD in 8 weeks. Remains on Pantoprazole 20 mg twice daily currently. Doing well overall. Symptoms have improved. History of GERD, was on omeprazole previously but was having daily breakthrough symptoms.      Denies nausea, vomiting, dysphagia, odynophagia, abdominal pain, unintentional weight loss, changes in bowel habits, diarrhea, constipation, hematochezia, melena.     NSAID use: none  Alcohol: none  Tobacco: none     Family history negative for GI/Pancreas or liver disorders/cancers     History of obstructive sleep apnea: No  History of respiratory illness/home oxygen supplementation: No  History of cardiac illness/pacemaker/AICD/blood thinners: Patient taking Aspirin 81 mg, instructed to continue medication.  History of anxiety/depression or chronic narcotic pain medication use: No  History of diabetes: No  History of mobility issues: No        Prior GI Procedures:   History of Prep or Sedation intolerance:     Operative Report - Manuel Obrien MD - 09/30/2024 11:11 AM CDT  Formatting of this note might be different from the original.  PATIENT NAME: Rambo Freitas  MRN: [id number]  DATE OF OPERATION: 9/30/24  REFERRING PHYSICIAN: Dr. Oliva  Medications: MAC  PREOPERATIVE DIAGNOSIS  Coffee  ground emesis  POSTOPERATIVE DIAGNOSIS:  Severe esophagitis with confluent ulceration of the distal 15 cm of the esophagus. No active bleeding.  2 - 3 cm hiatal hernia.  Diffuse gastritis. Biopsies were done to assess for h pylori infection.  Normal duodenum. Biopsies were done to assess for celiac disease.    PROCEDURE PERFORMED:  Esophagogastroduodenoscopy with biopsies  Endoscopist: Manuel Obrien MD    PROCEDURE AND FINDINGS: The patient was placed into the left lateral decubitus after informed consent was obtained. All questions were answered. An updated history and physical were performed and an ASA score of 2 was assigned. Informed consent was obtained prior to the procedure, with review of possible complications including bleeding, infection, and missed cancer. MAC sedation was administered.  The [employer] video gastroscope was introduced into the mouth and passed into the esophogus after administration of MAC sedation. The entire examined esophagus was remarkable for severe esophagitis with confluent ulceration of the distal 15 cm of the esophagus. No active bleeding noted. There was a 2 - 3 cm hiatal hernia. The entire examined stomach, including retroflexion view was remarkable for diffuse gastritis with friability, edema and erythema. Biopsies were done to assess for h pylori infection. The examined duodenum to the third portion was normal. Biopsies were done to assess for celiac disease. The gastroscope was removed from the patient. The procedure was completed. The patient tolerated the procedure well.    RECOMMENDATIONS  Advance diet as tolerated.  Imodium 2 mg po every 2 - 4 hours as needed for diarrhea.  Continue iv protonix 40 mg bid. Ok to switch to po once pt is tolerating po intake.  Monitor labs - hgb and liver function tests.  Manuel Obrien MD, Gastroenterologist  Cc: Dr. Oliva  Electronically signed by Manuel Obrien MD at 09/30/2024 11:11 AM CDT   inal Diagnosis:     A. Duodenum,  biopsy:  -Duodenal mucosa with focal active inflammation and erosion, suggestive of peptic duodenitis.  -No evidence of gluten sensitive enteropathy.  -Negative for dysplasia or malignancy.     B. Stomach, biopsy:  -Gastric mucosa with changes suggestive of proton pump inhibitor effect.   -No morphologic evidence of Helicobacter pylori organisms.    Electronically signed by Marry Clark MD on 10/1/2024 at 12:39 PM      MEDICAL HISTORY:      Allergies:  Allergies   No Known Allergies             Past Medical History:   Diagnosis Date    Back pain       2 bulging disks    Bilateral hearing loss, unspecified hearing loss type 3/16/2017    Exercise-induced asthma      Mitral valve prolapse       has been checked for heart problems and arrythmia at [facility] (about 2013)    Rib fracture 2013     left side    Sleep walking              Past Surgical History:   Procedure Laterality Date    TONSILLECTOMY   age 17      Patient Active Problem List:     Lumbar disc herniation     Bilateral hearing loss, unspecified hearing loss type     Injury of adductor muscle and tendon of right thigh     Labral tear of right hip joint     Primary osteoarthritis of right hip     Injury of adductor muscle and tendon of right thigh, sequela     Elevated blood pressure, situational     DDD (degenerative disc disease), lumbar     Closed displaced fracture of acromial end of left clavicle with routine healing, subsequent encounter     Lumbar facet arthropathy     Opiate use     Chronic midline low back pain without sciatica     Gastroesophageal reflux disease without esophagitis     Exercise-induced asthma     Screening for disorder of blood and blood-forming organs     Folliculitis     Alcohol withdrawal syndrome with complication (HCC)     Elevated blood pressure     Hand pain, right     Seizure (HCC)             Current Outpatient Medications   Medication Sig Dispense Refill    SYMBICORT 160-4.5 MCG/ACT Inhalation Aerosol INHALE 2 PUFFS BY  MOUTH INTO THE LUNGS TWICE DAILY 30.6 g 0    gabapentin 300 MG Oral Cap Take 1 capsule (300 mg total) by mouth 2 (two) times daily. 60 capsule 1    clonazePAM 0.5 MG Oral Tab Take 1 tablet (0.5 mg total) by mouth daily as needed. 30 tablet 0    methylPREDNISolone 4 MG Oral Tablet Therapy Pack Use as directed for 6 days 21 tablet 0    traMADol HCl (ULTRAM) 50 MG Oral Tab Take 1 tablet (50 mg total) by mouth every 8 (eight) hours as needed for Pain. For 30 days 75 tablet 3    ketoconazole 2 % External Cream Apply to affected areas of face BID x 4-6 weeks 60 g 2    hydrocortisone 2.5 % External Cream Apply to affected areas of face up to BID x 7-10 days, then hold x 1 week.  Resume as needed. 28 g 1    OMEPRAZOLE 40 MG Oral Capsule Delayed Release TAKE 1 CAPSULE(40 MG) BY MOUTH DAILY BEFORE A MEAL 30 capsule 5      @ALL@   Social and Family History:  Social History    Tobacco Use      Smoking status: Former        Packs/day: 1.00        Years: 1 pack/day for 10.0 years (10.0 ttl pk-yrs)        Types: Cigarettes      Smokeless tobacco: Never      Tobacco comments: uses e-cigs    Alcohol use: Yes      Alcohol/week: 0.0 standard drinks of alcohol      Comment: very rare stopped on 2016    Drug use: No           Family History   Problem Relation Age of Onset    Breast Cancer Mother      Heart Disease Father           pacemaker    Heart Disease Sister           pacemaker     Heart Disease Paternal Aunt           pacemaker    Heart Disease Paternal Aunt           pacemaker         REVIEW OF SYSTEMS:   GENERAL: feels well otherwise  SKIN: denies any unusual skin lesions  EYES: denies any new visual changes or double vision  HEENT: denies any new hearing changes, nasal congestion, sinus pain  LUNGS: denies shortness of breath with exertion  CARDIOVASCULAR: denies chest pain on exertion  GI: as above  : denies dysuria, nocturia or changes in stream  MUSCULOSKELETAL: denies new myalgias, swelling  NEURO:  denies new sensory or motor deficits.  PSYCHE: denies depression or anxiety  HEMATOLOGIC: denies bleeding or bruising  ENDOCRINE: denies inc in thirst or heat/cold intolerance  ALL/ASTHMA: denies hx of allergy or asthma     EXAM:   There were no vitals taken for this visit.  GENERAL: well developed, well nourished,in no apparent distress  SKIN: no rashes,no suspicious lesions  HEENT: atraumatic, normocephalic, oropharynx clear  NECK: supple,no adenopathy, no masses  GI: no masses or ascites, normal liver span/no organomegaly  RECTAL: deferred  EXTREMITIES: no cyanosis, clubbing or edema  MUSCULOSKELETAL: no joint deformity, swelling or erythema  PSYCH: normal affect  NUT: well nourished appearing, no cachexia           RADIOLOGICAL DATA:      Impression   IMPRESSION:   1.  Mild hepatic steatosis.              Study Result     Narrative   DATE OF SERVICE: 12.08.2019    US ABDOMEN COMPLETE (CPT=76700)  CLINICAL INDICATION:  Acute viral hepatitis, unspecified.  COMPARISON:  None available.  TECHNIQUE: Gray scale imaging of the entire abdomen was performed.  FINDINGS:  PANCREAS:  The visualized portions of the pancreas are without gross abnormalities with some limited evaluation  due to overlying bowel gas.  LIVER:  The liver measures up to 18.3 cm in mid axillary length.  Increased parenchymal echogenicity without focal solid or cystic lesions.    There is no intrahepatic biliary ductal dilatation.  Main portal venous flow is hepatopetal flow.  Peak portal venous velocity is 22.7 cm/s.  BILIARY SYSTEM:       GALLBLADDER: The gallbladder demonstrates no gross abnormalities.       COMMON DUCT: The common bile duct measures 4mm.       FRYE'S SIGN: Negative..  RIGHT KIDNEY:  The right kidney measure 10.9 x 6.3 x 5.7 cm. Total volume is 203.6 mL.  It maintains normal cortical thickness and echogenicity without solid or cystic lesion.  There is no nephrolithiasis, hydronephrosis or perinephric fluid collection.  LEFT  KIDNEY:  The left kidney measures 11.1 x 5.0 x 5.1 cm. Total volume is 149.3 mL.  It maintains normal cortical thickness and echogenicity without solid or cystic lesion.  There is no nephrolithiasis, hydronephrosis or perinephric fluid collection.  SPLEEN:  The spleen measures 11.2 x 7.9 x cm. Total volume is mL.    It demonstrates no significant focal abnormality.  AORTA:  The visualized aorta is normal in caliber in its visualized portions.  IVC:  The IVC is patent.   Impression   IMPRESSION:  1.  Mild hepatic steatosis.         ASSESSMENT      colon cancer screening   Hx of severe esophagitis         PLAN:      Colonoscopy and EGD with Dr. Obrien and MAC. PEG prep. The procedure and risk of procedure was discussed with the patient in detail including but not limited to bleeding, perforation and medication reaction, missed lesions, complication leading up to prolonged hospital surgery, aspiration.  Alternatives and options were discussed, as well as rationale. Patient verbalizes understanding.  All questions answered.    Continue Pantoprazole 40 mg once daily, 30 minutes before meals

## 2024-12-12 NOTE — BRIEF OP NOTE
Pre-Operative Diagnosis: GERD WITH ESOPHAGITIS WITHOUT HEMORRHAGE, PEPTIC DUODENITIS, COLON CANCER SCREENING     Post-Operative Diagnosis: EGD-NORMAL   COLON-POLYPS, diverticulosis      Procedure Performed:   ESOPHAGOGASTRODUODENOSCOPY (EGD),  COLONOSCOPY WITH COLD SNARE POLYPECTOMY    Surgeons and Role:     * Manuel Obrien MD - Primary    Assistant(s):        Surgical Findings: see above     Specimen: see op note     Estimated Blood Loss: No data recorded    Dictation Number:  none    Manuel Obrien MD  12/12/2024  8:53 AM

## 2024-12-12 NOTE — DISCHARGE INSTRUCTIONS
ENDOSCOPY DISCHARGE INSTRUCTIONS    Procedure Performed:   Gastroscopy, Colonoscopy, and Polypectomy  Endoscopist: No name on file  FINDINGS:   Diverticulosis (pockets in colon that develop with age and lack of fiber intake), Colon polyp(s) (a growth in the colon), and Normal EGD    MEDICATIONS:  You may resume all other medications today    DIET:  General    BIOPSIES:  Biopsies were taken (you will be notified of results in 7-10 days)      ADDITIONAL RECOMMENDATIONS:  Recommendations regarding repeat colonoscopy will be made once the biopsy results are reviewed.    Continue pantoprazole 40 mg daily.  Follow up with Dr. Obrien in 9 - 12 months.    Activity for remainder of today:    REST TODAY  DO NOT drive or operate heavy machinery  DO NOT drink any alcoholic beverages  DO NOT sign any legal documents or make any important decisions    After your procedure(s):  It is not unusual to feel bloated or gassy .  Passing gas and belching is encouraged. Lying on your left side with your knees flexed may relieve the discomfort. A hot pack to the abdomen may also help.    After your gastroscopy (upper endoscopy): You may experience a slight sore throat which will subside. Throat lozenges or salt water gargle can be used.    FOLLOW-UP:  Contact the office at [phone] for follow-up appointment if needed or if you develop any of the following:    Severe abdominal pain/discomfort       Excessive bleeding or black tarry stool  Difficulty breathing or swallowing        Persistent nausea,vomiting, or a fever above 100 degrees or chills

## 2024-12-12 NOTE — ANESTHESIA PREPROCEDURE EVALUATION
PRE-OP EVALUATION    Patient Name: Rambo Freitas    Admit Diagnosis: GERD WITH ESOPHAGITIS WITHOUT HEMORRHAGE, PEPTIC DUODENITIS, COLON CANCER SCREENING    Pre-op Diagnosis: GERD WITH ESOPHAGITIS WITHOUT HEMORRHAGE, PEPTIC DUODENITIS, COLON CANCER SCREENING    ESOPHAGOGASTRODUODENOSCOPY (EGD),  COLONOSCOPY    Anesthesia Procedure: ESOPHAGOGASTRODUODENOSCOPY (EGD), COLONOSCOPY  COLONOSCOPY    Surgeons and Role:     * Manuel Obrien MD - Primary    Pre-op vitals reviewed.  Temp: 97.6 °F (36.4 °C)  Pulse: 59  Resp: 20  BP: 145/85  SpO2: 100 %  Body mass index is 25.73 kg/m².    Current medications reviewed.  Hospital Medications:   glucose (Dex4) 15 GM/59ML oral liquid 15 g  15 g Oral Q15 Min PRN    Or    glucose (Glutose) 40% oral gel 15 g  15 g Oral Q15 Min PRN    Or    glucose-vitamin C (Dex-4) chewable tab 4 tablet  4 tablet Oral Q15 Min PRN    Or    dextrose 50% injection 50 mL  50 mL Intravenous Q15 Min PRN    Or    glucose (Dex4) 15 GM/59ML oral liquid 30 g  30 g Oral Q15 Min PRN    Or    glucose (Glutose) 40% oral gel 30 g  30 g Oral Q15 Min PRN    Or    glucose-vitamin C (Dex-4) chewable tab 8 tablet  8 tablet Oral Q15 Min PRN    lactated ringers infusion   Intravenous Continuous       Outpatient Medications:   Prescriptions Prior to Admission[1]    Allergies: Patient has no known allergies.      Anesthesia Evaluation    Patient summary reviewed.    Anesthetic Complications           GI/Hepatic/Renal      (+) GERD                           Cardiovascular      ECG reviewed.  Exercise tolerance: good     MET: >4      (+) hypertension             (+) valvular problems/murmurs and MVP                       Endo/Other      (+) diabetes and poorly controlled, type 1,                          Pulmonary      (+) asthma                     Neuro/Psych                 (+) neuromuscular disease                     Past Surgical History:   Procedure Laterality Date    Colonoscopy      Tonsillectomy  age 17      Social History     Socioeconomic History    Marital status:    Tobacco Use    Smoking status: Former     Current packs/day: 1.00     Average packs/day: 1 pack/day for 10.0 years (10.0 ttl pk-yrs)     Types: Cigarettes    Smokeless tobacco: Never   Vaping Use    Vaping status: Some Days    Substances: Nicotine   Substance and Sexual Activity    Alcohol use: Not Currently     Alcohol/week: 3.0 standard drinks of alcohol     Types: 3 Standard drinks or equivalent per week     Comment: Resumed drinking after getting out of alf. Drank for 5 days. 750mL of vodka per day    Drug use: No     History   Drug Use No     Available pre-op labs reviewed.  Lab Results   Component Value Date    WBC 6.4 10/02/2024    RBC 4.94 10/02/2024    HGB 14.2 10/02/2024    HCT 40.5 10/02/2024    MCV 82.0 10/02/2024    MCH 28.7 10/02/2024    MCHC 35.1 10/02/2024    RDW 12.6 10/02/2024    .0 (L) 10/02/2024     Lab Results   Component Value Date     10/02/2024    K 4.0 10/02/2024     10/02/2024    CO2 32.0 10/02/2024    BUN 14 10/02/2024    CREATSERUM 0.85 10/02/2024     (H) 10/02/2024    CA 9.5 10/02/2024            Airway      Mallampati: II  Mouth opening: 3 FB  TM distance: 4 - 6 cm  Neck ROM: full Cardiovascular      Rhythm: regular  Rate: normal     Dental             Pulmonary      Breath sounds clear to auscultation bilaterally.               Other findings              ASA: 2   Plan: MAC  NPO status verified and patient meets guidelines.  Patient has not taken beta blockers in last 24 hours.  Post-procedure pain management plan discussed with surgeon and patient.      Plan/risks discussed with: patient                Present on Admission:  **None**             [1]   Medications Prior to Admission   Medication Sig Dispense Refill Last Dose/Taking    GABAPENTIN 300 MG Oral Cap TAKE 1 CAPSULE BY MOUTH IN THE MORNING AND 1 AT NOON AND 1 EVERY DAY AT BEDTIME 90 capsule 0 12/11/2024    aspirin 81 MG  Oral Tab EC aspirin 81 mg tablet,delayed release, [RxNorm: 924641]   12/11/2024    hydrocortisone 2.5 % External Cream hydrocortisone 2.5 % External Cream, [RxNorm: 896079]   Taking    lisinopril 10 MG Oral Tab Take 1 tablet (10 mg total) by mouth daily.   12/11/2024    pantoprazole 20 MG Oral Tab EC Take 1 tablet (20 mg total) by mouth 2 (two) times daily before meals. 180 tablet 1 12/11/2024    atorvastatin 20 MG Oral Tab Take 1 tablet (20 mg total) by mouth nightly. 90 tablet 1 12/11/2024    traMADol 50 MG Oral Tab Take 1 tablet (50 mg total) by mouth every 8 (eight) hours as needed for Pain. 60 tablet 0 Past Month    Insulin Pen Needle (BD PEN NEEDLE SARITHA U/F) 32G X 4 MM Does not apply Misc Inject 3 times per day. Use a new pen needle with each injection 100 each 6 Taking    Continuous Glucose Sensor (DEXCOM G7 SENSOR) Does not apply Misc 1 each Every 10 days. 10 each 1 Taking    Insulin Lispro, 1 Unit Dial, (HUMALOG KWIKPEN) 100 UNIT/ML Subcutaneous Solution Pen-injector Test blood glucose 3 times daily before meals. Inject 4 units prior to meals and 1 unit of insulin for every 30 points of glucose is greater than 130mg/dL. MDD of 30 units 5 each 3 12/11/2024    insulin glargine (LANTUS SOLOSTAR) 100 UNIT/ML Subcutaneous Solution Pen-injector Inject 25 Units into the skin nightly. (Patient taking differently: Inject 24 Units into the skin nightly.) 5 each 3 12/11/2024    Blood Glucose Monitoring Suppl (ONETOUCH VERIO FLEX SYSTEM) w/Device Does not apply Kit Test blood sugar 3 times per day. 1 kit 0 Taking    Glucose Blood (ONETOUCH VERIO) In Vitro Strip Test blood sugar 3 times per day. 100 strip 6 Taking    OneTouch Delica Lancets 33G Does not apply Misc Test blood sugar 3 times per day. 100 each 6 Taking    SYMBICORT 160-4.5 MCG/ACT Inhalation Aerosol INHALE 2 PUFFS BY MOUTH INTO THE LUNGS TWICE DAILY 30.6 g 0 Past Week    albuterol 108 (90 Base) MCG/ACT Inhalation Aero Soln Inhale 2 puffs into the lungs  every 6 (six) hours as needed for Wheezing.   More than a month

## 2024-12-12 NOTE — OPERATIVE REPORT
PATIENT NAME: Rambo Freitas  MRN: [id number]  DATE OF OPERATION:  12/12/24  REFERRING PHYSICIAN: Dr. Singh  Medications:  MAC  DATE OF LAST COLONOSCOPY:  none  PREOPERATIVE DIAGNOSIS:   colon cancer screening    Hx of severe esophagitis  POSTOPERATIVE DIAGNOSIS:  Normal upper GI tract.  8 mm ascending colon polyp and 5 mm sigmoid colon polyp, both removed via cold snare.  Diverticulosis were scattered throughout the colon.     PROCEDURE PERFORMED:               Esophagogastroduodenoscopy                Colonoscopy with cold snare polypectomy     Endoscopist: Manuel Obrien MD     PROCEDURE AND FINDINGS: The patient was placed into the left lateral decubitus after informed consent was obtained.  All questions were answered.  An updated history and physical were performed and an ASA score of 2 was assigned. Informed consent was obtained prior to the procedure, with review of possible complications including bleeding, infection, and missed polyps and or cancer.  MAC sedation was administered.        The [employer] video gastroscope was introduced into the mouth and passed into the esophagus after administration of topical oral anesthesia and MAC sedation.  The entire esophagus was examined and was remarkable for normal mucosa, with complete healing of the esophageal ulcers.  The entire examined stomach,  including retroflexion view was visualized and was remarkable for normal.  The entire examined duodenum was visualized to the third portion and was remarkable for normal mucosa.   The gastroscope was removed from the patient.  The procedure was completed. The patient tolerated the procedure well.   The patient was turned around and a colonoscopy was performed.  The video adult colonoscope was passed from anus to cecum.  The ileocecal valve, appendiceal orfice, hepatic and splenic flexures were all well visualized.  The bowel preparation on the Aronchick bowel prep score was 1. (1 - excellent > 95% mucosa seen; 2 - good -  clear liquid up to 25% of the mucosa, 90% mucosa seen;  3 - fair - semisolid stool not suctioned, but 90% of the mucosa seen; 4 - poor - semisolid stool not suctioned, but < 90% mucosa seen; 5 - inadequate - repeat prep needed)                Findings included 8 mm ascending colon polyp and 5 mm sigmoid colon polyp, both removed via cold snare.    Diverticulosis were scattered throughout the colon.  On retroflexion of the scope in the anorectum, normal internal hemorrhoids were noted.     The scope withdrawal from cecum to anus was 10 minutes.    RECOMMENDATIONS      The patient will be provided with a written summary of today's endoscopic findings  The patient will be notified with biopsy results in 2 - 4 working days.   Recommendations regarding repeat colonoscopy will be made once the biopsy results are reviewed.    Continue pantoprazole 40 mg daily.  Follow up with Dr. Obrien in 9 - 12 months.    Manuel Obrien MD, Gastroenterologist  Cc: Dr. Singh

## 2024-12-16 ENCOUNTER — NURSE ONLY (OUTPATIENT)
Facility: CLINIC | Age: 44
End: 2024-12-16
Payer: MEDICAID

## 2024-12-16 DIAGNOSIS — E10.9 TYPE 1 DIABETES MELLITUS WITHOUT COMPLICATION (HCC): Primary | ICD-10-CM

## 2024-12-16 PROCEDURE — 99211 OFF/OP EST MAY X REQ PHY/QHP: CPT | Performed by: STUDENT IN AN ORGANIZED HEALTH CARE EDUCATION/TRAINING PROGRAM

## 2024-12-16 NOTE — PROGRESS NOTES
Insulin Pump Therapy 101    Rambo Dominguez [place]  3/19/1980    Referred by: Rianna Johnson DO       Patient seen by [facility]: In person    Background      Diabetic Medications               Insulin Lispro, 1 Unit Dial, (HUMALOG KWIKPEN) 100 UNIT/ML Subcutaneous Solution Pen-injector Test blood glucose 3 times daily before meals. Inject 4 units prior to meals and 1 unit of insulin for every 30 points of glucose is greater than 130mg/dL. MDD of 30 units    insulin glargine (LANTUS SOLOSTAR) 100 UNIT/ML Subcutaneous Solution Pen-injector Inject 25 Units into the skin nightly.     Patient taking differently: Inject 24 Units into the skin nightly.             DiabetesType 1; with long term use of insulin; without complications     Patient currently checking blood glucose levels via Dexcom G7 Using phone and connected to clinic; report downloaded and added to provider folder as necessary    Patient currently delivering insulin via MDI     Patient Concerns     - Bui snot want tubing  - would prefer to not have to count carbs anymore    --> discussed iLet but patient does not like tubing and how little insulin it holds    Education     Discussion included:  - Overview of Medtronic, Omnipod and Tandem pump options, and iLet Bionic Pancreas  - Basal / Bolus insulin  - Automated Delivery System operations  - Pros and Cons of each device  - CGM integration  - Social and scientific aspects which might effect choice  - Insurance and coverage considerations of each device      Patient most interested in OP5.  Starting sample pump in the new year to try before buying     Future Appointments   Date Time Provider Department Center   1/3/2025  7:30 AM EMG DIABETIC EDUCATOR ENDO [facility] [place]   1/13/2025  8:30 AM Rianna Johnson DO [id number] [facility] [place]   1/20/2025 10:30 AM BK MR RM1 (1.5T) [facility] [address]        12/16/2024  Narcisa Smith RN, MSN, BC-ADM, [facility]  Diabetes Care &        A total of 20  minutes was spent with the patient including chart review, discussion and education / advisement pertinent to patient and provider specified concerns as documented above.     Note to patient: The 21 Century Cures Act makes medical notes like these available to patients in the interest of transparency. However, be advised this is a medical document. It is intended as peer to peer communication. It is written in medical language and may contain abbreviations or verbiage that are unfamiliar. It may appear blunt or direct. Medical documents are intended to carry relevant information, facts as evident, and the clinical opinion of the practitioner.

## 2025-01-01 DIAGNOSIS — G89.29 CHRONIC MIDLINE LOW BACK PAIN WITHOUT SCIATICA: ICD-10-CM

## 2025-01-01 DIAGNOSIS — R56.9 FIRST TIME SEIZURE (HCC): ICD-10-CM

## 2025-01-01 DIAGNOSIS — M54.50 CHRONIC MIDLINE LOW BACK PAIN WITHOUT SCIATICA: ICD-10-CM

## 2025-01-01 DIAGNOSIS — G47.50 PARASOMNIA: ICD-10-CM

## 2025-01-01 DIAGNOSIS — E10.9 TYPE 1 DIABETES MELLITUS WITHOUT COMPLICATION (HCC): ICD-10-CM

## 2025-01-02 RX ORDER — GABAPENTIN 300 MG/1
CAPSULE ORAL
Qty: 90 CAPSULE | Refills: 0 | Status: SHIPPED | OUTPATIENT
Start: 2025-01-02

## 2025-01-02 RX ORDER — ATORVASTATIN CALCIUM 20 MG/1
20 TABLET, FILM COATED ORAL NIGHTLY
Qty: 90 TABLET | Refills: 0 | Status: SHIPPED | OUTPATIENT
Start: 2025-01-02

## 2025-01-03 ENCOUNTER — NURSE ONLY (OUTPATIENT)
Facility: CLINIC | Age: 45
End: 2025-01-03
Payer: MEDICAID

## 2025-01-03 DIAGNOSIS — E10.9 TYPE 1 DIABETES MELLITUS WITHOUT COMPLICATION (HCC): Primary | ICD-10-CM

## 2025-01-03 PROCEDURE — 99211 OFF/OP EST MAY X REQ PHY/QHP: CPT | Performed by: STUDENT IN AN ORGANIZED HEALTH CARE EDUCATION/TRAINING PROGRAM

## 2025-01-03 NOTE — PROGRESS NOTES
Omnipod 5 Initial Pump Training    Rambo Freitas  3/19/1980    Referred by: Rianna Johnson DO     Initial Pump Settings     Omnipod 5 + Dexcom in Automode  Time ICR ISF Active Insulin Time Target   MN 10 38 2 110     Starting Basal Rates - 0.95 u/hr      Training Topics     - OP 5 algorithm overview and explanation   - Overview of screens on sarwat / controller including visuals of CGM data, IOB, pod information and accessing the trend graph; instruction on overall physical use of the device.  - Overview of [place] menu and use of options as below   --> Switching modes (Manual vs Auto)    --> Activity mode / temp basal    --> Pod = pod info screen    --> CGM Calibration    --> Pause insulin (manual)    --> Manage programs (manual)    --> History / notifications    --> How to adjust settings   - How to place a pod on the body while using the sarwat / controller to start the pod session  - Pod placement options and appropriate skin care  - Overview of additional information including travel instructions, water resistance, appropriate storage of supplies, pod failure instructions with insulin delivery back up plan and review of hypo / hyperglycemia treatment.  - Alerts and alarms; when to contact insulet vs contact the clinic  - Connected to clinic via [employer]   --> Omnipod ID: [email or web address]), Password: [email or web address]    Patient shows positive ability to place and manage insulin pump appropriately during training.    Future Appointments   Date Time Provider Department Center   1/3/2025  7:30 AM EMG DIABETIC EDUCATOR ENDO [facility] [place]   1/13/2025  8:30 AM Rianna Johnson DO [id number] [facility] [place]   1/20/2025 10:30 AM BK MR RM1 (1.5T) [facility] [address]        1/3/2025  Narcisa Smith RN, MSN, BC-ADM, [facility] [place]  Diabetes Care &      A total of 60 minutes was spent with the patient including chart review, discussion and education / advisement pertinent to patient and provider  specified concerns as documented above.     Note to patient: The 21 Century Cures Act makes medical notes like these available to patients in the interest of transparency. However, be advised this is a medical document. It is intended as peer to peer communication. It is written in medical language and may contain abbreviations or verbiage that are unfamiliar. It may appear blunt or direct. Medical documents are intended to carry relevant information, facts as evident, and the clinical opinion of the practitioner.

## 2025-01-06 ENCOUNTER — TELEPHONE (OUTPATIENT)
Facility: CLINIC | Age: 45
End: 2025-01-06

## 2025-01-06 NOTE — TELEPHONE ENCOUNTER
2025, patient called in office, confirmed name and  - stated [facility] was to call him back today at 11 am to f/u on pump setting change. Call back number [phone]

## 2025-01-07 ENCOUNTER — PATIENT MESSAGE (OUTPATIENT)
Facility: CLINIC | Age: 45
End: 2025-01-07

## 2025-01-07 ENCOUNTER — NURSE ONLY (OUTPATIENT)
Facility: CLINIC | Age: 45
End: 2025-01-07
Payer: MEDICAID

## 2025-01-07 DIAGNOSIS — E10.9 TYPE 1 DIABETES MELLITUS WITHOUT COMPLICATION (HCC): Primary | ICD-10-CM

## 2025-01-07 PROCEDURE — 99211 OFF/OP EST MAY X REQ PHY/QHP: CPT | Performed by: STUDENT IN AN ORGANIZED HEALTH CARE EDUCATION/TRAINING PROGRAM

## 2025-01-07 RX ORDER — PROCHLORPERAZINE 25 MG/1
1 SUPPOSITORY RECTAL
Qty: 9 EACH | Refills: 2 | Status: SHIPPED | OUTPATIENT
Start: 2025-01-07

## 2025-01-07 RX ORDER — INSULIN LISPRO 100 [IU]/ML
INJECTION, SOLUTION INTRAVENOUS; SUBCUTANEOUS
Qty: 5 EACH | Refills: 0 | Status: SHIPPED | OUTPATIENT
Start: 2025-01-07

## 2025-01-07 RX ORDER — INSULIN PMP CART,AUT,G6/7,CNTR
1 EACH SUBCUTANEOUS CONTINUOUS PRN
Qty: 1 KIT | Refills: 0 | Status: SHIPPED | OUTPATIENT
Start: 2025-01-07

## 2025-01-07 RX ORDER — PROCHLORPERAZINE 25 MG/1
SUPPOSITORY RECTAL
Qty: 1 EACH | Refills: 2 | Status: SHIPPED | OUTPATIENT
Start: 2025-01-07

## 2025-01-07 RX ORDER — INSULIN GLARGINE 100 [IU]/ML
25 INJECTION, SOLUTION SUBCUTANEOUS NIGHTLY
Qty: 5 EACH | Refills: 0 | Status: SHIPPED | OUTPATIENT
Start: 2025-01-07

## 2025-01-07 RX ORDER — INSULIN LISPRO 100 [IU]/ML
INJECTION, SOLUTION INTRAVENOUS; SUBCUTANEOUS
Qty: 40 ML | Refills: 2 | Status: SHIPPED | OUTPATIENT
Start: 2025-01-07

## 2025-01-07 RX ORDER — INSULIN PMP CART,AUT,G6/7,CNTR
1 EACH SUBCUTANEOUS
Qty: 30 EACH | Refills: 3 | Status: SHIPPED | OUTPATIENT
Start: 2025-01-07

## 2025-01-07 NOTE — TELEPHONE ENCOUNTER
Patient Current Pump Settings:   Omnipod 5 + Dexcom in Automode  Time ICR ISF Active Insulin Time Target   MN 10 38 2 110       Pump Data Review          Interpretation of Data:  TIR to goal, hyperglycemia present after meals, areas of hypoglycemia occurring after patient adds additional boluses to help bring blood glucose level down after a meal.       Recommendations / Plan      Omnipod 5 + Dexcom in Automode  Time ICR ISF Active Insulin Time Target   MN 10 --> 9 38 2 110

## 2025-01-07 NOTE — PROGRESS NOTES
Pump Settings Adjustment Evaluation - Op5    Rambo Freitas  3/19/1980    Referred by: Rianna Johnson DO       Patient seen by [facility]: Via virtual visit  -  This visit is conducted using Telemedicine with live, interactive video and audio. Patient has been referred to the [facility] website at [email or web address] to review the yearly Consent to Treat document. Patient understands and accepts financial responsibility for any deductible, co-insurance and/or co-pays associated with this service.     Medical Background      Past Medical History:    Asthma (HCC)    Back pain    2 bulging disks    Back problem    Degenerative disc disease in lower back    Bilateral hearing loss, unspecified hearing loss type    Exercise-induced asthma (HCC)    Mitral valve prolapse    has been checked for heart problems and arrythmia at [place] (about 2013)    Rib fracture    left side    Sleep walking    Type 1 diabetes mellitus (HCC)    Visual impairment    Glasses      Lab Results   Component Value Date    A1C 8.7 (H) 09/29/2024    A1C 5.2 12/02/2019            Patient has T1DM, seen today regarding pump start check in     Medication Review      Diabetic Meds       Insulin Disp Start End     Insulin Lispro, 1 Unit Dial, (HUMALOG KWIKPEN) 100 UNIT/ML Subcutaneous Solution Pen-injector 5 each 10/7/2024 --    Sig: Test blood glucose 3 times daily before meals. Inject 4 units prior to meals and 1 unit of insulin for every 30 points of glucose is greater than 130mg/dL. MDD of 30 units    Notes to Pharmacy: May substitute if not on insurance formulary     insulin glargine (LANTUS SOLOSTAR) 100 UNIT/ML Subcutaneous Solution Pen-injector 5 each 10/2/2024 --    Sig - Route: Inject 25 Units into the skin nightly. - Subcutaneous    Patient taking differently: Inject 24 Units into the skin nightly.    Notes to Pharmacy: May substitute if not on insurance formulary    Renewals       Renewal requests to authorizing provider (Yesenia Tinoco  MD) <b>prohibited</b>               Diabetic Supplies Disp Start End     Continuous Glucose Sensor (DEXCOM G7 SENSOR) Does not apply Misc 10 each 10/7/2024 --    Sig - Route: 1 each Every 10 days. - Does not apply             Patient Current Pump Settings:   Omnipod 5 + Dexcom in Automode  Time ICR ISF Active Insulin Time Target   MN 10 38 2 110           Pump Data Review             Interpretation of Data:  TIR to goal, hyperglycemia present after meals, areas of hypoglycemia occurring after patient adds additional boluses to help bring blood glucose level down after a meal.     Patient Concerns      - would like to switch to G6 and iphone sarwat, would like to stay on omnipod    Recommendations / Plan      Omnipod 5 + Dexcom in Automode  Time ICR ISF Active Insulin Time Target   MN 10  38 2 110 --> 120     - Use the correction feature to treat high blood glucose after meals rather than adding extra carbs to avoid lows     Future Appointments   Date Time Provider Department Center   1/7/2025 12:45 PM EMG DIABETIC EDUCATOR ENDO [facility] [place]   1/13/2025  8:30 AM Rianna Johnson DO [id number] [facility] [place]   1/20/2025 10:30 AM BK MR RM1 (1.5T)  [address]        1/7/2025  Narcisa Smith RN, MSN, BC-ADM, [facility]  Diabetes Care &        A total of 30 minutes was spent with the patient including chart review, discussion and education / advisement pertinent to patient and provider specified concerns as documented above.     Note to patient: The 21 Century Cures Act makes medical notes like these available to patients in the interest of transparency. However, be advised this is a medical document. It is intended as peer to peer communication. It is written in medical language and may contain abbreviations or verbiage that are unfamiliar. It may appear blunt or direct. Medical documents are intended to carry relevant information, facts as evident, and the clinical opinion of the practitioner.

## 2025-01-08 ENCOUNTER — TELEPHONE (OUTPATIENT)
Facility: CLINIC | Age: 45
End: 2025-01-08

## 2025-01-08 DIAGNOSIS — E10.9 TYPE 1 DIABETES MELLITUS WITHOUT COMPLICATION (HCC): Primary | ICD-10-CM

## 2025-01-08 RX ORDER — PEN NEEDLE, DIABETIC 31 GX3/16"
NEEDLE, DISPOSABLE MISCELLANEOUS
Qty: 400 EACH | Refills: 1 | Status: SHIPPED | OUTPATIENT
Start: 2025-01-08

## 2025-01-09 ENCOUNTER — TELEPHONE (OUTPATIENT)
Dept: CASE MANAGEMENT | Age: 45
End: 2025-01-09

## 2025-01-09 NOTE — TELEPHONE ENCOUNTER
Endocrine Refill protocol for Glucose testing supplies     Protocol Criteria: PASSED Reason: N/A    If below requirement is met, send a 90-day supply with 1 refill per provider protocol.    Verify appointment with Endocrinology completed in the last 6 months or scheduled in the next 3 months.    Last completed office visit: 11/18/2024 Elizabeth Arechiga APN   Next scheduled Follow up:   Future Appointments   Date Time Provider Department Center   1/13/2025  8:30 AM Rianna Johnson DO [id number] [facility] [place]   1/20/2025 10:30 AM DUSTIN MR RM1 (1.5T) [facility] [address]       Passed and sent per protocol. Med list updated.

## 2025-01-09 NOTE — TELEPHONE ENCOUNTER
MRI Denied     Your doctor told us that you have lower back pain. An imaging study was asked for. We  cannot approve this request because:  Imaging requires six weeks of provider directed treatment to be completed. This must  have been completed in the past three months without improved symptoms. Contact  (via office visit, phone, email, or messaging) must occur after the treatment is  completed. This has not been met because:  The notes sent to us do not show you have completed a full six weeks of this type of  treatment.  Your treatment did not occur within the last three months.  Symptoms must be the same or worse after treatment to support imaging.  The notes sent to us do not show any contact with your provider after you completed  your treatment. This contact is needed to plan your future care. [facility]  Guidelines Section(s): Low Back (Lumbar Spine) Pain without Neurological Features  (SP 5.1) and 1.0 General Guidelines.  You are allotted an additional seven calendar days from the notification of adverse  determination to send one set of additional clinical or complete a [id number]  discussion with a Medical Director (by calling [phone], select Option 4).  Reference number [id number] will need to be entered or provided. This must be  completed within 7 calendar days from the date of this letter.

## 2025-01-10 ENCOUNTER — TELEPHONE (OUTPATIENT)
Facility: CLINIC | Age: 45
End: 2025-01-10

## 2025-01-10 ENCOUNTER — PATIENT MESSAGE (OUTPATIENT)
Dept: CASE MANAGEMENT | Age: 45
End: 2025-01-10

## 2025-01-10 DIAGNOSIS — E10.9 TYPE 1 DIABETES MELLITUS WITHOUT COMPLICATION (HCC): ICD-10-CM

## 2025-01-10 RX ORDER — INSULIN LISPRO 100 [IU]/ML
INJECTION, SOLUTION INTRAVENOUS; SUBCUTANEOUS
Qty: 15 ML | Refills: 0 | OUTPATIENT
Start: 2025-01-10

## 2025-01-10 RX ORDER — INSULIN GLARGINE 100 [IU]/ML
INJECTION, SOLUTION SUBCUTANEOUS
Qty: 15 ML | Refills: 0 | OUTPATIENT
Start: 2025-01-10

## 2025-01-17 ENCOUNTER — TELEPHONE (OUTPATIENT)
Dept: FAMILY MEDICINE CLINIC | Facility: CLINIC | Age: 45
End: 2025-01-17

## 2025-01-17 ENCOUNTER — TELEPHONE (OUTPATIENT)
Facility: CLINIC | Age: 45
End: 2025-01-17

## 2025-01-17 NOTE — TELEPHONE ENCOUNTER
Get the PT done.  I had ordered PT back in October.  I also had given him referral to Dr. Allison.  Follow-up with Dr. Allison

## 2025-01-17 NOTE — TELEPHONE ENCOUNTER
Pt said his MRI was denied by insurance and he would first need 6 wks of treatment.  Pls advise   Face to face

## 2025-01-17 NOTE — TELEPHONE ENCOUNTER
RN phoned patient to confirm whether or not he wanted a PA for G6 or G7 dexcom    Patient reviewed that for now G6 as he wants to try with his pump in order to use the sarwat on his phone.     PA in CMM Dexcom G6 sensors    KEY: [id number]    Questions answered and sent to plan- If you have any questions about your PA submission, contact [employer] at [phone].    PA in CMM Dexcom G6 transmitter    KEY: [id number]    Questions answered and sent to plan- If you have any questions about your PA submission, contact [employer] at [phone].    Will await determination

## 2025-01-20 ENCOUNTER — TELEPHONE (OUTPATIENT)
Facility: CLINIC | Age: 45
End: 2025-01-20

## 2025-01-20 NOTE — TELEPHONE ENCOUNTER
Patient placed call to clinic to request a sample G7.  Sample given to luis, patient to  after 9AM on 1/21/2025.

## 2025-01-21 ENCOUNTER — MED REC SCAN ONLY (OUTPATIENT)
Facility: CLINIC | Age: 45
End: 2025-01-21

## 2025-01-21 NOTE — TELEPHONE ENCOUNTER
Approval For Dexcom G6  Effective 1/125 and ends 1/18/26    Case [id number]  Placed in [address]

## 2025-01-30 DIAGNOSIS — G89.29 CHRONIC MIDLINE LOW BACK PAIN WITHOUT SCIATICA: ICD-10-CM

## 2025-01-30 DIAGNOSIS — M54.50 CHRONIC MIDLINE LOW BACK PAIN WITHOUT SCIATICA: ICD-10-CM

## 2025-01-30 DIAGNOSIS — G47.50 PARASOMNIA: ICD-10-CM

## 2025-01-30 DIAGNOSIS — R56.9 FIRST TIME SEIZURE (HCC): ICD-10-CM

## 2025-01-31 RX ORDER — GABAPENTIN 300 MG/1
CAPSULE ORAL
Qty: 90 CAPSULE | Refills: 0 | Status: SHIPPED | OUTPATIENT
Start: 2025-01-31

## 2025-02-04 ENCOUNTER — TELEPHONE (OUTPATIENT)
Facility: CLINIC | Age: 45
End: 2025-02-04

## 2025-02-04 NOTE — TELEPHONE ENCOUNTER
02/05/2024, Receieved fax from [facility] approval for OMNIPOD 5 [id number] PODS GEN 5 MISC effective 01/01/2025 to 02/04/2026...send to scanning

## 2025-02-04 NOTE — TELEPHONE ENCOUNTER
25-Patient called into clinic today.  Verified name and .  Patient asked for Narcisa WEAVER however she was with a patient.  He states he needs a PA for Omnipod intro kit and pods and asked that I let her know.  Received a fax from [employer] for a PA for Omnipod 5 Dexcom G7/G6 pods.  Given to Narcisa WEAVER.

## 2025-02-10 ENCOUNTER — TELEMEDICINE (OUTPATIENT)
Facility: CLINIC | Age: 45
End: 2025-02-10
Payer: MEDICAID

## 2025-02-10 DIAGNOSIS — E11.69 HYPERLIPIDEMIA ASSOCIATED WITH TYPE 2 DIABETES MELLITUS (HCC): Primary | ICD-10-CM

## 2025-02-10 DIAGNOSIS — E78.5 HYPERLIPIDEMIA ASSOCIATED WITH TYPE 2 DIABETES MELLITUS (HCC): Primary | ICD-10-CM

## 2025-02-10 DIAGNOSIS — E10.9 TYPE 1 DIABETES MELLITUS WITHOUT COMPLICATION (HCC): ICD-10-CM

## 2025-02-10 RX ORDER — INSULIN LISPRO 100 [IU]/ML
INJECTION, SOLUTION INTRAVENOUS; SUBCUTANEOUS
Qty: 40 ML | Refills: 2 | Status: SHIPPED | OUTPATIENT
Start: 2025-02-10

## 2025-02-10 RX ORDER — INSULIN GLARGINE 100 [IU]/ML
25 INJECTION, SOLUTION SUBCUTANEOUS NIGHTLY
Qty: 5 EACH | Refills: 0 | Status: SHIPPED | OUTPATIENT
Start: 2025-02-10

## 2025-02-10 NOTE — PROGRESS NOTES
[facility] Note    Name: Rambo Freitas    Date: 2/10/2025    Rambo Freitas is a 44 year old male who presents for evaluation of T1DM management.     Chief complaint: No chief complaint on file.    Patient verbally consents to a Video/Telephone service for this visit.  Patient understands and accepts financial responsibility for any deductible, co-insurance and/or co-pays associated with this service.       Subjective:   Initial HPI consult - 10/7/2024  Pt was seen by Kyra Espinoza 10/1/2024 when he was admitted with uncontrolled T1/AV DM  Per review: Upon interview today, patient states he was originally diagnosed with T2DM while incarcerated and was treated with metformin; since that time, he was seen by a PCP while incarcerated who performed antibody testing and diagnosed him with AV/T1 DM. Since then, he has been on basal/bolus infusion without issue. 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 He states compliance with his basal/bolus insulin regimen prior to his ETOH binge that caused admission.      DM hx:  -Diagnosed with diabetes in 2021 as at T2DM, started on metformin/glipiizde. Did not note improvement in BG; started on Lantus and Novolog   -Family history- yes, strong diabetes family hx  ; father, grandparents with T2DM   -Re: potential DM medication contraindication (if positive, checkbox selected):  [] History of pancreatitis  [] Personal/fam hx of medullary thyroid cancer/MEN2  [] History of recent/frequent UTI/yeast infxn  [] Previous amputation related to diabetes    -Presence of associated DM complications (if positive, checkbox selected):  [] Macrovascular complications (CAD/CVA/PAD)  [x] Neuropathy  [] Retinopathy  [] Nephropathy  [] HTN  [x] Hyperlipidemia  [] Stroke/TIA  [] Gastroparesis  [] Wound, ulcer or amputations    - Lifestyle: works in construction     DM meds at first office  visit: Lantus 34 units at bedtime and Novolog 4u with 1:50>150 twice daily (not particularly with meals) --> Lantus 24 units at bedtime and Novolog 1:30 over 130 with meals     Continuous Glucose Monitoring Interpretation  Rambo Freitas has undergone continuous glucose monitoring with the Dexcom G7 CGM with phone (connected to clinic profile).  The blood glucose tracings were evaluated for two weeks prior to office visit. Blood glucose tracings demonstrated areas of hyperglycemia from 9A to 12A.. There were no areas of hypoglycemia notedduring the weeks of evaluation.       Interval Hx 02/10/25   Started on OmniPod 5 with Dexcom, sample given to patient 1/21/2025. He feels very happy with this change.   He does note low glucose when he is working physically so he has tried to keep glucose level above 150; working 730-530 M-F  He picked up a new OmniPod pack around 10 days ago. Does keep it on manual overnight because he is worried about hypoglycemia.   Trying to do gluten free and low carb diet since the last 4-5 days   Current settings: Basal 12 AM 0.95 units/h, total 22.8 units  Sensitivity 12 AM 50  Carb ratio 12 AM 10 g  Blood glucose target 120    Continuous Glucose Monitoring Interpretation  Rambo Freitas has undergone continuous glucose monitoring with the Omnipod 5 with phone (connected to clinic profile).  The blood glucose tracings were evaluated for two weeks prior to office visit. Blood glucose tracings demonstrated areas of hyperglycemia that have improved over the last few days . There were no areas of hypoglycemia notedduring the weeks of evaluation.  Target range 40%, high 38%, very high 22%.  89.8% time CGM is active.  Average glucose of 205.  0% low or very low. Jan 28 2025 to Monday, February 10, 2025.  On average, using 33.6 units of insulin per day, 75% basal and 25% bolus. He reset his target glucose 110-150           Previously trialed/failed DM meds: metformin and  glipizide (discontinue after diagnosis of AV)    History/Other:    Allergies, PMH, SocHx and FHx reviewed and updated as appropriate in Epic on    insulin lispro (HUMALOG) 100 UNIT/ML Injection Solution Inject up to 40 u daily via insulin pump 40 mL 2    insulin glargine (LANTUS SOLOSTAR) 100 UNIT/ML Subcutaneous Solution Pen-injector Inject 25 Units into the skin nightly. In case of pump failure 5 each 0     No Known Allergies  Current Outpatient Medications   Medication Sig Dispense Refill    insulin lispro (HUMALOG) 100 UNIT/ML Injection Solution Inject up to 40 u daily via insulin pump 40 mL 2    insulin glargine (LANTUS SOLOSTAR) 100 UNIT/ML Subcutaneous Solution Pen-injector Inject 25 Units into the skin nightly. In case of pump failure 5 each 0    GABAPENTIN 300 MG Oral Cap TAKE 1 CAPSULE BY MOUTH ONCE DAILY IN THE MORNING, ONE  AT  NOON  AND  ONE  AT  BEDTIME 90 capsule 0    Insulin Pen Needle (TRUEPLUS PEN NEEDLES) 31G X 5 MM Does not apply Misc Use to inject insulin 4 times daily. 90 day supply. 400 each 1    Continuous Glucose Transmitter (DEXCOM G6 TRANSMITTER) Does not apply Misc Change every 90 days 1 each 2    Continuous Glucose Sensor (DEXCOM G6 SENSOR) Does not apply Misc 1 each Every 10 days. Use as directed every 10 days 9 each 2    Insulin Disposable Pump (OMNIPOD 5 [id number] INTRO GEN 5) Does not apply Kit 1 each continuous prn. 1 kit 0    Insulin Disposable Pump (OMNIPOD 5 [id number] PODS GEN 5) Does not apply Misc 1 each every third day. 30 each 3    Insulin Lispro, 1 Unit Dial, (HUMALOG KWIKPEN) 100 UNIT/ML Subcutaneous Solution Pen-injector Test blood glucose 3 times daily before meals. Inject 4 units prior to meals and 1 unit of insulin for every 30 points of glucose is greater than 130mg/dL. MDD of 30 units 5 each 0    ATORVASTATIN 20 MG Oral Tab Take 1 tablet by mouth nightly 90 tablet 0    aspirin 81 MG Oral Tab EC aspirin 81 mg tablet,delayed release, [RxNorm: 407096]      hydrocortisone  2.5 % External Cream hydrocortisone 2.5 % External Cream, [RxNorm: 432102]      lisinopril 10 MG Oral Tab Take 1 tablet (10 mg total) by mouth daily.      pantoprazole 20 MG Oral Tab EC Take 1 tablet (20 mg total) by mouth 2 (two) times daily before meals. 180 tablet 1    traMADol 50 MG Oral Tab Take 1 tablet (50 mg total) by mouth every 8 (eight) hours as needed for Pain. 60 tablet 0    Continuous Glucose Sensor (DEXCOM G7 SENSOR) Does not apply Misc 1 each Every 10 days. 10 each 1    Blood Glucose Monitoring Suppl (ONETOUCH VERIO FLEX SYSTEM) w/Device Does not apply Kit Test blood sugar 3 times per day. 1 kit 0    Glucose Blood (ONETOUCH VERIO) In Vitro Strip Test blood sugar 3 times per day. 100 strip 6    OneTouch Delica Lancets 33G Does not apply Misc Test blood sugar 3 times per day. 100 each 6    albuterol 108 (90 Base) MCG/ACT Inhalation Aero Soln Inhale 2 puffs into the lungs every 6 (six) hours as needed for Wheezing.      SYMBICORT 160-4.5 MCG/ACT Inhalation Aerosol INHALE 2 PUFFS BY MOUTH INTO THE LUNGS TWICE DAILY 30.6 g 0     Past Medical History:    Asthma (HCC)    Back pain    2 bulging disks    Back problem    Degenerative disc disease in lower back    Bilateral hearing loss, unspecified hearing loss type    Exercise-induced asthma (HCC)    Mitral valve prolapse    has been checked for heart problems and arrythmia at [facility] (about )    Rib fracture    left side    Sleep walking    Type 1 diabetes mellitus (HCC)    Visual impairment    Glasses     Family History   Problem Relation Age of Onset    Breast Cancer Mother     Heart Disease Father         pacemaker    Heart Disease Sister         pacemaker     Heart Disease Paternal Aunt         pacemaker    Heart Disease Paternal Aunt         pacemaker     Social history: Reviewed.      ROS/Exam    REVIEW OF SYSTEMS: Ten point review of systems has been performed and is otherwise negative and/or non-contributory, except as described above.      VITALS  There were no vitals filed for this visit.      Wt Readings from Last 6 Encounters:   12/12/24 217 lb (98.4 kg)   10/25/24 217 lb (98.4 kg)   10/07/24 244 lb (110.7 kg)   10/02/24 244 lb 7.8 oz (110.9 kg)   09/25/24 230 lb (104.3 kg)   09/09/20 202 lb 6.4 oz (91.8 kg)       PHYSICAL EXAM- limited due to telemedicine encounter    Constitutional Not in acute distress  Pulmonary: no wheezing heard, no coughing on the phone. Speaking in full sentences.  Neurological:  Alert and oriented to person, place and time.   Psychiatric: Normal affect, mood and behavior appropriate      Labs/Imaging: Pertinent imaging reviewed.    Overall glucose control:  Lab Results   Component Value Date    A1C 8.7 (H) 09/29/2024    A1C 5.2 12/02/2019       Supplementary Documentation:   -Surveillance for Diabetes Complications & Risks  Foot exam/neuropathy: No data recorded  Retinopathy screening: No data recordedNo data recorded    Assessment & Plan:     ICD-10-CM    1. Hyperlipidemia associated with type 2 diabetes mellitus (HCC)  E11.69     E78.5       2. Type 1 diabetes mellitus without complication (HCC)  E10.9 insulin lispro (HUMALOG) 100 UNIT/ML Injection Solution     insulin glargine (LANTUS SOLOSTAR) 100 UNIT/ML Subcutaneous Solution Pen-injector            Rambo Freitas is a pleasant 44 year old male here for evaluation of:    #Diabetes- PMHx of Type 1 diabetes mellitus diagnosed in 2021.        Last A1c value was 8.7% done 9/29/2024.  Goal <7%. Importance of better glucose control in preventing onset/progression of end-organ damage discussed, as well as goals of therapy and clinical significance of A1C. We reviewed CGM data and patient with pp hyperglycemia without any hypoglycemia   Current regimen: Omnipod 5 ISF 12 AM 50, ICR 12 AM 10, basal 12 AM 0.95 units/h, target 1 10-1 50.  Patient runs in manual mode overnight to avoid hypoglycemia.  Overall, patient very concerned regarding hypoglycemia since he is  active during workday and notes glucose can become low  Glucose review: Target range 40%, high 38%, very high 22%.  89.8% time CGM is active.  Average glucose of 205.  0% low or very low. Jan 28 2025 to Monday, February 10, 2025. On average, using 33.6 units of insulin per day, 75% basal and 25% bolus. He reset his target glucose 110-150     - med changes: Continue current omnipod 5 pump settings; patient with improved glucose in target range over the last 4 to 5 days after he made dietary changes   12A ISF 50   12A ICR 10   12A 0.95u/hr   Target 110-150   - Continue baby ASA    - continue to check BG 3x/day using glucometer or CGM  - meet with endo DM educator re:glucose check in in 3-4 weeks   - Discussed adding GLP-1 to current medication regimen, including action, risk vs benefit, dosing, and potential side effects. Patient denies hx of pancreatitis, gastroparesis, or personal or family hx of medullary thyroid CA or MEN 2.   Patient would like to hold off at this time.  -See above header \"Supplementary Documentation\" for surveillance for diabetes complications & risks    #Nephropathy screening/CKD:   Lab Results   Component Value Date    EGFRCR 109 10/02/2024    MICROALBCREA 3.1 10/07/2024      #Blood pressure control: SBP is to goal <130   BP Readings from Last 1 Encounters:   12/12/24 (!) 127/96   BP Meds: lisinopril Tabs - 10 MG     #Hyperlipidemia/Lipids: LDL is not to goal ; due for repeat  Lab Results   Component Value Date    LDL 86 10/07/2024    TRIG 101 10/07/2024   Cholesterol Meds: atorvastatin Tabs - 20 MG Atorvastatin 20 mg          Return in about 6 months (around 8/10/2025).    2/10/2025  Rianna Johnson, DO    Note to patient: The 21 Century Cures Act makes medical notes like these available to patients in the interest of transparency. However, be advised this is a medical document. It is intended as peer to peer communication. It is written in medical language and may contain abbreviations or  verbiage that are unfamiliar. It may appear blunt or direct. Medical documents are intended to carry relevant information, facts as evident, and the clinical opinion of the practitioner.

## 2025-02-19 ENCOUNTER — TELEPHONE (OUTPATIENT)
Facility: CLINIC | Age: 45
End: 2025-02-19

## 2025-02-19 NOTE — TELEPHONE ENCOUNTER
Pt phoned office stating he hasn't been able to get refill of Lispro- sent at LOV on 2/10/25. Phoned his pharmacy. States patient picked up 75 day supply of Lispro on 1/9/25, first able to do another fill on 3/4. Right now \"refill too soon\".    MCM update sent to patient, asked to reach out to office if will run out of insulin before 3/4.    Closing this encounter.

## 2025-02-20 ENCOUNTER — LAB ENCOUNTER (OUTPATIENT)
Dept: LAB | Age: 45
End: 2025-02-20
Attending: FAMILY MEDICINE
Payer: MEDICAID

## 2025-02-20 DIAGNOSIS — F10.10 ALCOHOL ABUSE: ICD-10-CM

## 2025-02-20 DIAGNOSIS — N17.9 AKI (ACUTE KIDNEY INJURY): ICD-10-CM

## 2025-02-20 LAB
ALBUMIN SERPL-MCNC: 4.2 G/DL (ref 3.2–4.8)
ALBUMIN/GLOB SERPL: 1.8 {RATIO} (ref 1–2)
ALP LIVER SERPL-CCNC: 62 U/L
ALT SERPL-CCNC: 338 U/L
ANION GAP SERPL CALC-SCNC: 7 MMOL/L (ref 0–18)
AST SERPL-CCNC: 201 U/L (ref ?–34)
BASOPHILS # BLD AUTO: 0.05 X10(3) UL (ref 0–0.2)
BASOPHILS NFR BLD AUTO: 1.1 %
BILIRUB SERPL-MCNC: 0.9 MG/DL (ref 0.3–1.2)
BUN BLD-MCNC: 11 MG/DL (ref 9–23)
CALCIUM BLD-MCNC: 9.4 MG/DL (ref 8.7–10.6)
CHLORIDE SERPL-SCNC: 99 MMOL/L (ref 98–112)
CO2 SERPL-SCNC: 30 MMOL/L (ref 21–32)
CREAT BLD-MCNC: 1.22 MG/DL
EGFRCR SERPLBLD CKD-EPI 2021: 75 ML/MIN/1.73M2 (ref 60–?)
EOSINOPHIL # BLD AUTO: 0.25 X10(3) UL (ref 0–0.7)
EOSINOPHIL NFR BLD AUTO: 5.5 %
ERYTHROCYTE [DISTWIDTH] IN BLOOD BY AUTOMATED COUNT: 15.1 %
FASTING STATUS PATIENT QL REPORTED: NO
GLOBULIN PLAS-MCNC: 2.3 G/DL (ref 2–3.5)
GLUCOSE BLD-MCNC: 411 MG/DL (ref 70–99)
HCT VFR BLD AUTO: 41 %
HGB BLD-MCNC: 13.7 G/DL
IMM GRANULOCYTES # BLD AUTO: 0.02 X10(3) UL (ref 0–1)
IMM GRANULOCYTES NFR BLD: 0.4 %
LYMPHOCYTES # BLD AUTO: 1.18 X10(3) UL (ref 1–4)
LYMPHOCYTES NFR BLD AUTO: 25.9 %
MCH RBC QN AUTO: 28.7 PG (ref 26–34)
MCHC RBC AUTO-ENTMCNC: 33.4 G/DL (ref 31–37)
MCV RBC AUTO: 86 FL
MONOCYTES # BLD AUTO: 0.41 X10(3) UL (ref 0.1–1)
MONOCYTES NFR BLD AUTO: 9 %
NEUTROPHILS # BLD AUTO: 2.64 X10 (3) UL (ref 1.5–7.7)
NEUTROPHILS # BLD AUTO: 2.64 X10(3) UL (ref 1.5–7.7)
NEUTROPHILS NFR BLD AUTO: 58.1 %
OSMOLALITY SERPL CALC.SUM OF ELEC: 299 MOSM/KG (ref 275–295)
PLATELET # BLD AUTO: 151 10(3)UL (ref 150–450)
POTASSIUM SERPL-SCNC: 5.1 MMOL/L (ref 3.5–5.1)
PROT SERPL-MCNC: 6.5 G/DL (ref 5.7–8.2)
RBC # BLD AUTO: 4.77 X10(6)UL
SODIUM SERPL-SCNC: 136 MMOL/L (ref 136–145)
VIT B12 SERPL-MCNC: 663 PG/ML (ref 211–911)
VIT D+METAB SERPL-MCNC: 27.7 NG/ML (ref 30–100)
WBC # BLD AUTO: 4.6 X10(3) UL (ref 4–11)

## 2025-02-20 PROCEDURE — 85025 COMPLETE CBC W/AUTO DIFF WBC: CPT

## 2025-02-20 PROCEDURE — 84425 ASSAY OF VITAMIN B-1: CPT

## 2025-02-20 PROCEDURE — 82607 VITAMIN B-12: CPT

## 2025-02-20 PROCEDURE — 82306 VITAMIN D 25 HYDROXY: CPT

## 2025-02-20 PROCEDURE — 36415 COLL VENOUS BLD VENIPUNCTURE: CPT

## 2025-02-20 PROCEDURE — 80053 COMPREHEN METABOLIC PANEL: CPT

## 2025-02-21 ENCOUNTER — LAB ENCOUNTER (OUTPATIENT)
Dept: LAB | Age: 45
End: 2025-02-21
Attending: FAMILY MEDICINE
Payer: MEDICAID

## 2025-02-21 ENCOUNTER — TELEPHONE (OUTPATIENT)
Dept: FAMILY MEDICINE CLINIC | Facility: CLINIC | Age: 45
End: 2025-02-21

## 2025-02-21 ENCOUNTER — OFFICE VISIT (OUTPATIENT)
Dept: FAMILY MEDICINE CLINIC | Facility: CLINIC | Age: 45
End: 2025-02-21
Payer: MEDICAID

## 2025-02-21 VITALS
SYSTOLIC BLOOD PRESSURE: 120 MMHG | HEART RATE: 74 BPM | BODY MASS INDEX: 25.15 KG/M2 | OXYGEN SATURATION: 99 % | HEIGHT: 77 IN | WEIGHT: 213 LBS | DIASTOLIC BLOOD PRESSURE: 76 MMHG | RESPIRATION RATE: 16 BRPM

## 2025-02-21 DIAGNOSIS — E10.9 TYPE 1 DIABETES MELLITUS WITHOUT COMPLICATION (HCC): Primary | ICD-10-CM

## 2025-02-21 DIAGNOSIS — G47.00 INSOMNIA, UNSPECIFIED TYPE: ICD-10-CM

## 2025-02-21 DIAGNOSIS — N63.20 PAINFUL LUMPY LEFT BREAST: ICD-10-CM

## 2025-02-21 DIAGNOSIS — N17.9 AKI (ACUTE KIDNEY INJURY): ICD-10-CM

## 2025-02-21 DIAGNOSIS — N64.4 BREAST PAIN, LEFT: ICD-10-CM

## 2025-02-21 DIAGNOSIS — J45.990 EXERCISE-INDUCED ASTHMA (HCC): ICD-10-CM

## 2025-02-21 DIAGNOSIS — N64.4 PAINFUL LUMPY LEFT BREAST: ICD-10-CM

## 2025-02-21 DIAGNOSIS — R53.82 CHRONIC FATIGUE: ICD-10-CM

## 2025-02-21 DIAGNOSIS — R74.8 ELEVATED LIVER ENZYMES: ICD-10-CM

## 2025-02-21 DIAGNOSIS — Z87.81 HISTORY OF RIB FRACTURE: ICD-10-CM

## 2025-02-21 DIAGNOSIS — E55.9 VITAMIN D DEFICIENCY: ICD-10-CM

## 2025-02-21 LAB
BILIRUB UR QL STRIP.AUTO: NEGATIVE
CLARITY UR REFRACT.AUTO: CLEAR
COLOR UR AUTO: YELLOW
GLUCOSE UR STRIP.AUTO-MCNC: >1000 MG/DL
KETONES UR STRIP.AUTO-MCNC: NEGATIVE MG/DL
LEUKOCYTE ESTERASE UR QL STRIP.AUTO: NEGATIVE
NITRITE UR QL STRIP.AUTO: NEGATIVE
PH UR STRIP.AUTO: 5.5 [PH] (ref 5–8)
PROT UR STRIP.AUTO-MCNC: NEGATIVE MG/DL
RBC UR QL AUTO: NEGATIVE
SP GR UR STRIP.AUTO: >1.03 (ref 1–1.03)
UROBILINOGEN UR STRIP.AUTO-MCNC: NORMAL MG/DL

## 2025-02-21 PROCEDURE — 99214 OFFICE O/P EST MOD 30 MIN: CPT | Performed by: FAMILY MEDICINE

## 2025-02-21 PROCEDURE — 81003 URINALYSIS AUTO W/O SCOPE: CPT

## 2025-02-21 RX ORDER — ERGOCALCIFEROL 1.25 MG/1
50000 CAPSULE, LIQUID FILLED ORAL WEEKLY
Qty: 12 CAPSULE | Refills: 0 | Status: SHIPPED | OUTPATIENT
Start: 2025-02-21

## 2025-02-21 RX ORDER — CLONAZEPAM 0.5 MG/1
0.5 TABLET ORAL NIGHTLY PRN
Qty: 30 TABLET | Refills: 1 | Status: SHIPPED | OUTPATIENT
Start: 2025-02-21

## 2025-02-21 RX ORDER — LISINOPRIL 10 MG/1
10 TABLET ORAL DAILY
Qty: 90 TABLET | Refills: 1 | Status: SHIPPED | OUTPATIENT
Start: 2025-02-21

## 2025-02-21 RX ORDER — BUDESONIDE AND FORMOTEROL FUMARATE DIHYDRATE 160; 4.5 UG/1; UG/1
2 AEROSOL RESPIRATORY (INHALATION) 2 TIMES DAILY
Qty: 30.6 G | Refills: 5 | Status: SHIPPED | OUTPATIENT
Start: 2025-02-21

## 2025-02-21 NOTE — TELEPHONE ENCOUNTER
Please send alternative that is covered.  And let them know he can take both just to space then by 6 hrs.

## 2025-02-21 NOTE — TELEPHONE ENCOUNTER
Pt is at [facility] pharmacy right now.   He said [facility] told him they did not receive an rx for symbacort.  He also said they do not want to fill the clonazepam because there is an interaction with the tramadol.   Pt was here today to see Dr. Mosher and spoke to him about it.   Please call pharmacy.   They are on lunch between 1:30 and 2:00

## 2025-02-21 NOTE — TELEPHONE ENCOUNTER
Please see attached.   Symbicort needs PA.  OK for alternate?  Clozapine can cause constipation, as can Tramadol ; concurrent use might increase the risk of developing intestinal obstruction.  advises caution. Both Tramadol and Clozapine can increase the risk of hyponatraemia.  Please advise.  Thanks.

## 2025-02-23 NOTE — PROGRESS NOTES
Subjective:   Patient ID: Rambo Freitas is a 44 year old male.    1. Type 1 diabetes mellitus without complication (HCC).  Chronic.  Last A1c value was 8.7% done 9/29/2024.       + vit D def.  Not taking vit D.  No fractures.  Mild fatigue.    3. Insomnia, unspecified type.  Chronic.  Otc meds not helping a lot.    4. Chronic fatigue.  No unexpected weight loss.    6. History of rib fracture.  Hx of asthma.  + wheezing.  Need refill on steroid inhaler.    Elevated liver enzymes.  Occasional alcohol use.      NAKUL (acute kidney injury).  Seen on blood test.  No symptoms.  No hematuria.    Painful lumpy left breast.  Worried about cancer.  On going for a few months, then became painful.          History/Other:   Review of Systems   All other systems reviewed and are negative.    Current Outpatient Medications   Medication Sig Dispense Refill    ergocalciferol 1.25 MG (52531 UT) Oral Cap Take 1 capsule (50,000 Units total) by mouth once a week. Once weekly x 12 weeks. 12 capsule 0    lisinopril 10 MG Oral Tab Take 1 tablet (10 mg total) by mouth daily. 90 tablet 1    clonazePAM 0.5 MG Oral Tab Take 1 tablet (0.5 mg total) by mouth nightly as needed (sleep). 30 tablet 1    Budesonide-Formoterol Fumarate (SYMBICORT) 160-4.5 MCG/ACT Inhalation Aerosol Inhale 2 puffs into the lungs 2 (two) times daily. 30.6 g 5    GABAPENTIN 300 MG Oral Cap TAKE 1 CAPSULE BY MOUTH ONCE DAILY IN THE MORNING, ONE  AT  NOON  AND  ONE  AT  BEDTIME 90 capsule 0    Insulin Pen Needle (TRUEPLUS PEN NEEDLES) 31G X 5 MM Does not apply Misc Use to inject insulin 4 times daily. 90 day supply. 400 each 1    Continuous Glucose Transmitter (DEXCOM G6 TRANSMITTER) Does not apply Misc Change every 90 days 1 each 2    Continuous Glucose Sensor (DEXCOM G6 SENSOR) Does not apply Misc 1 each Every 10 days. Use as directed every 10 days 9 each 2    Insulin Disposable Pump (OMNIPOD 5 [id number] INTRO GEN 5) Does not apply Kit 1 each continuous prn. 1 kit  0    Insulin Disposable Pump (OMNIPOD 5 [id number] PODS GEN 5) Does not apply Misc 1 each every third day. 30 each 3    Insulin Lispro, 1 Unit Dial, (HUMALOG KWIKPEN) 100 UNIT/ML Subcutaneous Solution Pen-injector Test blood glucose 3 times daily before meals. Inject 4 units prior to meals and 1 unit of insulin for every 30 points of glucose is greater than 130mg/dL. MDD of 30 units 5 each 0    ATORVASTATIN 20 MG Oral Tab Take 1 tablet by mouth nightly 90 tablet 0    aspirin 81 MG Oral Tab EC aspirin 81 mg tablet,delayed release, [RxNorm: 751561]      pantoprazole 20 MG Oral Tab EC Take 1 tablet (20 mg total) by mouth 2 (two) times daily before meals. 180 tablet 1    traMADol 50 MG Oral Tab Take 1 tablet (50 mg total) by mouth every 8 (eight) hours as needed for Pain. 60 tablet 0    Blood Glucose Monitoring Suppl (ONETOUCH VERIO FLEX SYSTEM) w/Device Does not apply Kit Test blood sugar 3 times per day. 1 kit 0    Glucose Blood (ONETOUCH VERIO) In Vitro Strip Test blood sugar 3 times per day. 100 strip 6    OneTouch Delica Lancets 33G Does not apply Misc Test blood sugar 3 times per day. 100 each 6    albuterol 108 (90 Base) MCG/ACT Inhalation Aero Soln Inhale 2 puffs into the lungs every 6 (six) hours as needed for Wheezing.       Allergies:Allergies[1]    Objective:   Physical Exam  Vitals reviewed.   Constitutional:       General: He is not in acute distress.     Appearance: He is well-developed. He is not diaphoretic.   Eyes:      General: No scleral icterus.        Right eye: No discharge.         Left eye: No discharge.      Conjunctiva/sclera: Conjunctivae normal.   Cardiovascular:      Rate and Rhythm: Normal rate and regular rhythm.      Heart sounds: Normal heart sounds.   Pulmonary:      Effort: Pulmonary effort is normal. No respiratory distress.      Breath sounds: Normal breath sounds. No wheezing or rales.   Musculoskeletal:      Comments: Left breast small lump.  1 cm.  Mild tenderness.       Assessment  & Plan:   1. Type 1 diabetes mellitus without complication (HCC)    2. Vitamin D deficiency    3. Insomnia, unspecified type    4. Chronic fatigue    5. Exercise-induced asthma (HCC)    6. History of rib fracture    7. Elevated liver enzymes    8. NAKUL (acute kidney injury)    9. Breast pain, left    10. Painful lumpy left breast      1. Type 1 diabetes mellitus without complication (HCC)  - lisinopril 10 MG Oral Tab; Take 1 tablet (10 mg total) by mouth daily.  Dispense: 90 tablet; Refill: 1    2. Vitamin D deficiency  - ergocalciferol 1.25 MG (55767 UT) Oral Cap; Take 1 capsule (50,000 Units total) by mouth once a week. Once weekly x 12 weeks.  Dispense: 12 capsule; Refill: 0    3. Insomnia, unspecified type  - clonazePAM 0.5 MG Oral Tab; Take 1 tablet (0.5 mg total) by mouth nightly as needed (sleep).  Dispense: 30 tablet; Refill: 1    4. Chronic fatigue  - Testosterone, Total Free, Male [E]; Future  - Urinalysis, Routine [E]; Future    5. Exercise-induced asthma (HCC)  - Budesonide-Formoterol Fumarate (SYMBICORT) 160-4.5 MCG/ACT Inhalation Aerosol; Inhale 2 puffs into the lungs 2 (two) times daily.  Dispense: 30.6 g; Refill: 5    6. History of rib fracture  - Budesonide-Formoterol Fumarate (SYMBICORT) 160-4.5 MCG/ACT Inhalation Aerosol; Inhale 2 puffs into the lungs 2 (two) times daily.  Dispense: 30.6 g; Refill: 5    7. Elevated liver enzymes  - US LIVER WITH ELASTOGRAPHY(CPT=76705,45293); Future  - Gastro Referral - In Network    8. NAKUL (acute kidney injury)  - Urinalysis, Routine [E]; Future    9. Breast pain, left  - US BREAST LEFT COMPLETE (CPT=76641); Future    10. Painful lumpy left breast  - US BREAST LEFT COMPLETE (CPT=76641); Future    Orders Placed This Encounter   Procedures    Testosterone, Total Free, Male [E]    Urinalysis, Routine [E]       Meds This Visit:  Requested Prescriptions     Signed Prescriptions Disp Refills    ergocalciferol 1.25 MG (44688 UT) Oral Cap 12 capsule 0     Sig: Take 1  capsule (50,000 Units total) by mouth once a week. Once weekly x 12 weeks.    lisinopril 10 MG Oral Tab 90 tablet 1     Sig: Take 1 tablet (10 mg total) by mouth daily.    clonazePAM 0.5 MG Oral Tab 30 tablet 1     Sig: Take 1 tablet (0.5 mg total) by mouth nightly as needed (sleep).    Budesonide-Formoterol Fumarate (SYMBICORT) 160-4.5 MCG/ACT Inhalation Aerosol 30.6 g 5     Sig: Inhale 2 puffs into the lungs 2 (two) times daily.       Imaging & Referrals:  GASTRO - INTERNAL  US LIVER WITH ELASTOGRAPHY(CPT=76705,15506)  US BREAST LEFT COMPLETE (CPT=76641)         [1] No Known Allergies

## 2025-02-24 LAB — VITAMIN B1 WHOLE BLD: 89.4 NMOL/L

## 2025-02-24 NOTE — TELEPHONE ENCOUNTER
Spoke to pharmacist, states they do not have a prescription for Symbicort, please resend  Also Clonazepam requires a PA dut to opiod use concurrent therapy.  Pharmacist is stating it is fairly affordable and pt can pay out of pocket if he chooses.

## 2025-02-27 ENCOUNTER — TELEPHONE (OUTPATIENT)
Dept: FAMILY MEDICINE CLINIC | Facility: CLINIC | Age: 45
End: 2025-02-27

## 2025-02-27 DIAGNOSIS — Z87.81 HISTORY OF RIB FRACTURE: ICD-10-CM

## 2025-02-27 DIAGNOSIS — J45.990 EXERCISE-INDUCED ASTHMA (HCC): ICD-10-CM

## 2025-02-27 NOTE — TELEPHONE ENCOUNTER
Budesonide-Formoterol Fumarate (SYMBICORT) 160-4.5 MCG/ACT Inhalation Aerosol       Questioning the quantity

## 2025-02-28 RX ORDER — BUDESONIDE AND FORMOTEROL FUMARATE DIHYDRATE 160; 4.5 UG/1; UG/1
2 AEROSOL RESPIRATORY (INHALATION) 2 TIMES DAILY
Qty: 10.2 G | Refills: 5 | Status: SHIPPED | OUTPATIENT
Start: 2025-02-28

## 2025-03-03 DIAGNOSIS — R56.9 FIRST TIME SEIZURE (HCC): ICD-10-CM

## 2025-03-03 DIAGNOSIS — M54.50 CHRONIC MIDLINE LOW BACK PAIN WITHOUT SCIATICA: ICD-10-CM

## 2025-03-03 DIAGNOSIS — G89.29 CHRONIC MIDLINE LOW BACK PAIN WITHOUT SCIATICA: ICD-10-CM

## 2025-03-03 DIAGNOSIS — G47.50 PARASOMNIA: ICD-10-CM

## 2025-03-04 RX ORDER — GABAPENTIN 300 MG/1
CAPSULE ORAL
Qty: 90 CAPSULE | Refills: 1 | Status: SHIPPED | OUTPATIENT
Start: 2025-03-04

## 2025-03-04 NOTE — TELEPHONE ENCOUNTER
A refill request was received for:  Requested Prescriptions     Pending Prescriptions Disp Refills    GABAPENTIN 300 MG Oral Cap [Pharmacy Med Name: Gabapentin 300 MG Oral Capsule] 90 capsule 0     Sig: TAKE 1 CAPSULE BY MOUTH ONCE DAILY IN THE MORNING 1 CAPSULE AT  NOON  AND  1 CAPSULE  AT  BEDTIME       Last refill date:   1/31/2025    Last office visit: 2/21/2025    Follow up due:  No future appointments.

## 2025-03-05 ENCOUNTER — TELEPHONE (OUTPATIENT)
Facility: CLINIC | Age: 45
End: 2025-03-05

## 2025-03-05 RX ORDER — INSULIN GLARGINE 100 [IU]/ML
INJECTION, SOLUTION SUBCUTANEOUS NIGHTLY
Refills: 0 | Status: CANCELLED | OUTPATIENT
Start: 2025-03-05

## 2025-03-05 NOTE — TELEPHONE ENCOUNTER
Received phone call from patient.    States at his 2/10 visit it was discussed with Dr. Johnson to possibly start Lantus along with pump if blood sugars remained elevated.    He feels that sugars have remained elevated and wants to discuss doing that.    Saved everardoo report to Dr. Alex Morton.

## 2025-03-06 NOTE — TELEPHONE ENCOUNTER
Reviewed glucose report.  At his last office visit, we discussed adding GLP-1 to current medication regimen, but he wanted to hold off.  Is he interested in starting this now?

## 2025-03-13 ENCOUNTER — HOSPITAL ENCOUNTER (EMERGENCY)
Facility: HOSPITAL | Age: 45
Discharge: LEFT BEFORE DISCHARGE | End: 2025-03-13
Payer: MEDICAID

## 2025-03-13 VITALS
TEMPERATURE: 98 F | HEART RATE: 118 BPM | DIASTOLIC BLOOD PRESSURE: 94 MMHG | OXYGEN SATURATION: 96 % | SYSTOLIC BLOOD PRESSURE: 128 MMHG | RESPIRATION RATE: 19 BRPM

## 2025-03-13 LAB
ALBUMIN SERPL-MCNC: 4.5 G/DL (ref 3.2–4.8)
ALBUMIN/GLOB SERPL: 1.7 {RATIO} (ref 1–2)
ALP LIVER SERPL-CCNC: 84 U/L
ALT SERPL-CCNC: 145 U/L
ANION GAP SERPL CALC-SCNC: 17 MMOL/L (ref 0–18)
AST SERPL-CCNC: 180 U/L (ref ?–34)
ATRIAL RATE: 86 BPM
BASOPHILS # BLD AUTO: 0.04 X10(3) UL (ref 0–0.2)
BASOPHILS NFR BLD AUTO: 0.8 %
BILIRUB SERPL-MCNC: 2.4 MG/DL (ref 0.3–1.2)
BUN BLD-MCNC: 7 MG/DL (ref 9–23)
CALCIUM BLD-MCNC: 8.9 MG/DL (ref 8.7–10.6)
CHLORIDE SERPL-SCNC: 98 MMOL/L (ref 98–112)
CO2 SERPL-SCNC: 24 MMOL/L (ref 21–32)
CREAT BLD-MCNC: 0.93 MG/DL
EGFRCR SERPLBLD CKD-EPI 2021: 103 ML/MIN/1.73M2 (ref 60–?)
EOSINOPHIL # BLD AUTO: 0.06 X10(3) UL (ref 0–0.7)
EOSINOPHIL NFR BLD AUTO: 1.2 %
ERYTHROCYTE [DISTWIDTH] IN BLOOD BY AUTOMATED COUNT: 15.1 %
ETHANOL SERPL-MCNC: 326 MG/DL (ref ?–3)
GLOBULIN PLAS-MCNC: 2.7 G/DL (ref 2–3.5)
GLUCOSE BLD-MCNC: 162 MG/DL (ref 70–99)
HCT VFR BLD AUTO: 46.9 %
HGB BLD-MCNC: 16.4 G/DL
IMM GRANULOCYTES # BLD AUTO: 0.01 X10(3) UL (ref 0–1)
IMM GRANULOCYTES NFR BLD: 0.2 %
LYMPHOCYTES # BLD AUTO: 2.65 X10(3) UL (ref 1–4)
LYMPHOCYTES NFR BLD AUTO: 53.1 %
MCH RBC QN AUTO: 28.9 PG (ref 26–34)
MCHC RBC AUTO-ENTMCNC: 35 G/DL (ref 31–37)
MCV RBC AUTO: 82.6 FL
MONOCYTES # BLD AUTO: 0.44 X10(3) UL (ref 0.1–1)
MONOCYTES NFR BLD AUTO: 8.8 %
NEUTROPHILS # BLD AUTO: 1.79 X10 (3) UL (ref 1.5–7.7)
NEUTROPHILS # BLD AUTO: 1.79 X10(3) UL (ref 1.5–7.7)
NEUTROPHILS NFR BLD AUTO: 35.9 %
OSMOLALITY SERPL CALC.SUM OF ELEC: 290 MOSM/KG (ref 275–295)
P AXIS: 78 DEGREES
P-R INTERVAL: 150 MS
PLATELET # BLD AUTO: 243 10(3)UL (ref 150–450)
POTASSIUM SERPL-SCNC: 3.4 MMOL/L (ref 3.5–5.1)
PROT SERPL-MCNC: 7.2 G/DL (ref 5.7–8.2)
Q-T INTERVAL: 364 MS
QRS DURATION: 92 MS
QTC CALCULATION (BEZET): 435 MS
R AXIS: 69 DEGREES
RBC # BLD AUTO: 5.68 X10(6)UL
SODIUM SERPL-SCNC: 139 MMOL/L (ref 136–145)
T AXIS: 47 DEGREES
VENTRICULAR RATE: 86 BPM
WBC # BLD AUTO: 5 X10(3) UL (ref 4–11)

## 2025-03-13 PROCEDURE — 80053 COMPREHEN METABOLIC PANEL: CPT

## 2025-03-13 PROCEDURE — 99281 EMR DPT VST MAYX REQ PHY/QHP: CPT

## 2025-03-13 PROCEDURE — 85025 COMPLETE CBC W/AUTO DIFF WBC: CPT

## 2025-03-13 PROCEDURE — 93005 ELECTROCARDIOGRAM TRACING: CPT

## 2025-03-13 PROCEDURE — 36415 COLL VENOUS BLD VENIPUNCTURE: CPT

## 2025-03-13 PROCEDURE — 82077 ASSAY SPEC XCP UR&BREATH IA: CPT

## 2025-03-13 NOTE — ED INITIAL ASSESSMENT (HPI)
Patient in stating he has been drinking each day since March 8th heavily per day. States was sober prior to that after hx alcoholism. Patient c/o numbness to L arm that began March 8th.

## 2025-04-01 DIAGNOSIS — E10.9 TYPE 1 DIABETES MELLITUS WITHOUT COMPLICATION (HCC): ICD-10-CM

## 2025-04-01 RX ORDER — ATORVASTATIN CALCIUM 20 MG/1
20 TABLET, FILM COATED ORAL NIGHTLY
Qty: 90 TABLET | Refills: 0 | Status: SHIPPED | OUTPATIENT
Start: 2025-04-01

## 2025-04-10 ENCOUNTER — TELEPHONE (OUTPATIENT)
Facility: CLINIC | Age: 45
End: 2025-04-10

## 2025-04-10 NOTE — TELEPHONE ENCOUNTER
PA initiated through CMM  Sent to Plan , awaiting Determination     PA- Ozempic (0.25 or 0.5 MG/DOSE) 2MG/3ML pen-injectors  Key: [id number])

## 2025-04-14 NOTE — TELEPHONE ENCOUNTER
Adverse Benefit Determination  Approval For Rx Ozempic   Coverage dates 1/11/25-4/11/2026  Tacking Number - [id number]  Sent to scanning

## 2025-04-28 DIAGNOSIS — R56.9 FIRST TIME SEIZURE (HCC): ICD-10-CM

## 2025-04-28 DIAGNOSIS — M54.50 CHRONIC MIDLINE LOW BACK PAIN WITHOUT SCIATICA: ICD-10-CM

## 2025-04-28 DIAGNOSIS — G89.29 CHRONIC MIDLINE LOW BACK PAIN WITHOUT SCIATICA: ICD-10-CM

## 2025-04-28 DIAGNOSIS — G47.50 PARASOMNIA: ICD-10-CM

## 2025-04-28 RX ORDER — GABAPENTIN 300 MG/1
300 CAPSULE ORAL 3 TIMES DAILY
Qty: 90 CAPSULE | Refills: 0 | Status: SHIPPED | OUTPATIENT
Start: 2025-04-28

## 2025-04-28 NOTE — TELEPHONE ENCOUNTER
.A refill request was received for:  Requested Prescriptions     Pending Prescriptions Disp Refills    GABAPENTIN 300 MG Oral Cap [Pharmacy Med Name: Gabapentin 300 MG Oral Capsule] 90 capsule 0     Sig: TAKE 1 CAPSULE BY MOUTH IN THE MORNING, 1 CAPSULE AT NOON AND 1 CAPSULE AT BEDTIME       Last refill date:   3.4.25    Last office visit: 2.21.25    Follow up due:  No future appointments.

## 2025-05-01 ENCOUNTER — TELEPHONE (OUTPATIENT)
Dept: FAMILY MEDICINE CLINIC | Facility: CLINIC | Age: 45
End: 2025-05-01

## 2025-05-01 DIAGNOSIS — Z11.3 SCREENING FOR STD (SEXUALLY TRANSMITTED DISEASE): Primary | ICD-10-CM

## 2025-05-01 NOTE — TELEPHONE ENCOUNTER
We are not supposed to just order test without a visit.  However I am ordering the test.  And have patient follow-up on Thursday or Friday.

## 2025-05-01 NOTE — TELEPHONE ENCOUNTER
Patient states his girlfriend had a outbreak of herpes on vaginal area. She had a swab done and tested positive for HSV-2. Patient is requesting a full STD panel. Would like to be tested for HSV-2. Please advise. He has no bumps or lesions. But would like to be safe. Can we test with blood work for HSV-2? Please call patient.

## 2025-05-02 ENCOUNTER — TELEPHONE (OUTPATIENT)
Dept: FAMILY MEDICINE CLINIC | Facility: CLINIC | Age: 45
End: 2025-05-02

## 2025-05-02 ENCOUNTER — LAB ENCOUNTER (OUTPATIENT)
Dept: LAB | Age: 45
End: 2025-05-02
Attending: FAMILY MEDICINE
Payer: MEDICAID

## 2025-05-02 DIAGNOSIS — Z11.3 SCREENING FOR STD (SEXUALLY TRANSMITTED DISEASE): ICD-10-CM

## 2025-05-02 DIAGNOSIS — Z11.3 SCREENING EXAMINATION FOR STI: Primary | ICD-10-CM

## 2025-05-02 DIAGNOSIS — N63.20 PAINFUL LUMPY LEFT BREAST: ICD-10-CM

## 2025-05-02 DIAGNOSIS — N64.4 BREAST PAIN, LEFT: Primary | ICD-10-CM

## 2025-05-02 DIAGNOSIS — N64.4 PAINFUL LUMPY LEFT BREAST: ICD-10-CM

## 2025-05-02 DIAGNOSIS — R53.82 CHRONIC FATIGUE: ICD-10-CM

## 2025-05-02 LAB
HCV AB SERPL QL IA: NONREACTIVE
T PALLIDUM AB SER QL IA: NONREACTIVE

## 2025-05-02 PROCEDURE — 87491 CHLMYD TRACH DNA AMP PROBE: CPT

## 2025-05-02 PROCEDURE — 87389 HIV-1 AG W/HIV-1&-2 AB AG IA: CPT

## 2025-05-02 PROCEDURE — 86695 HERPES SIMPLEX TYPE 1 TEST: CPT

## 2025-05-02 PROCEDURE — 86696 HERPES SIMPLEX TYPE 2 TEST: CPT

## 2025-05-02 PROCEDURE — 84403 ASSAY OF TOTAL TESTOSTERONE: CPT

## 2025-05-02 PROCEDURE — 84402 ASSAY OF FREE TESTOSTERONE: CPT

## 2025-05-02 PROCEDURE — 36415 COLL VENOUS BLD VENIPUNCTURE: CPT

## 2025-05-02 PROCEDURE — 87591 N.GONORRHOEAE DNA AMP PROB: CPT

## 2025-05-02 PROCEDURE — 86803 HEPATITIS C AB TEST: CPT

## 2025-05-02 PROCEDURE — 86780 TREPONEMA PALLIDUM: CPT

## 2025-05-02 NOTE — TELEPHONE ENCOUNTER
Marcelina, [phone]  radiology calling regarding US L breast order from Feb 2025, she says radiology guidelines require order for diagnostic mammo bilateral - please change

## 2025-05-05 LAB
C TRACH DNA SPEC QL NAA+PROBE: NEGATIVE
HSV 1 GLYCOPROTEIN G, IGG: POSITIVE
HSV 2 GLYCOPROTEIN G, IGG: NEGATIVE
N GONORRHOEA DNA SPEC QL NAA+PROBE: NEGATIVE

## 2025-05-06 LAB
FREE TESTOST DIRECT: 9.9 PG/ML
TESTOSTERONE: 844 NG/DL

## 2025-05-08 DIAGNOSIS — E55.9 VITAMIN D DEFICIENCY: ICD-10-CM

## 2025-05-08 RX ORDER — ERGOCALCIFEROL 1.25 MG/1
50000 CAPSULE, LIQUID FILLED ORAL WEEKLY
Qty: 12 CAPSULE | Refills: 0 | OUTPATIENT
Start: 2025-05-08

## 2025-05-16 DIAGNOSIS — E10.9 TYPE 1 DIABETES MELLITUS WITHOUT COMPLICATION (HCC): ICD-10-CM

## 2025-05-17 DIAGNOSIS — E55.9 VITAMIN D DEFICIENCY: ICD-10-CM

## 2025-05-19 RX ORDER — INSULIN GLARGINE 100 [IU]/ML
24 INJECTION, SOLUTION SUBCUTANEOUS NIGHTLY
Qty: 30 ML | Refills: 1 | Status: SHIPPED | OUTPATIENT
Start: 2025-05-19

## 2025-05-19 RX ORDER — ERGOCALCIFEROL 1.25 MG/1
50000 CAPSULE, LIQUID FILLED ORAL WEEKLY
Qty: 12 CAPSULE | Refills: 0 | OUTPATIENT
Start: 2025-05-19

## 2025-05-19 NOTE — TELEPHONE ENCOUNTER
Last office visit 02/10/2025 \"Lantus 24 units at bedtime\"    Endocrine refill protocol for basal insulins     Protocol Criteria: FAILED Reason: No labs completed in required time frame    If all below requirements are met, send a 90-day supply with 1 refill per provider protocol.       Verify Appointment with Endocrinology completed in the last 6 months or scheduled in the next 3 months.  Verify A1C has been completed within the last 6 months and is below 8.5%     Last completed office visit:10/7/2024 Rianna Johnson DO   Last completed telemed visit: 2/10/2025 Rianna Johnson DO  Next scheduled Follow up:   Future Appointments   Date Time Provider Department Center   5/23/2025  1:40 PM [facility]   5/29/2025  6:00 AM PF US RM1 PF US [place]   6/3/2025  9:30 AM Juan M Mosher DO [facility] [address]   6/5/2025 10:00 AM Justin Valadez MD [id number]      Last A1c result: Last A1c value was 8.7% done 9/29/2024.

## 2025-05-21 ENCOUNTER — HOSPITAL ENCOUNTER (OUTPATIENT)
Age: 45
Discharge: LEFT BEFORE DISCHARGE | End: 2025-05-21
Attending: EMERGENCY MEDICINE
Payer: MEDICAID

## 2025-05-21 ENCOUNTER — APPOINTMENT (OUTPATIENT)
Dept: GENERAL RADIOLOGY | Age: 45
End: 2025-05-21
Attending: EMERGENCY MEDICINE
Payer: MEDICAID

## 2025-05-21 VITALS
DIASTOLIC BLOOD PRESSURE: 83 MMHG | RESPIRATION RATE: 19 BRPM | BODY MASS INDEX: 26.57 KG/M2 | TEMPERATURE: 98 F | HEART RATE: 100 BPM | WEIGHT: 225 LBS | SYSTOLIC BLOOD PRESSURE: 141 MMHG | HEIGHT: 77 IN | OXYGEN SATURATION: 97 %

## 2025-05-21 DIAGNOSIS — S76.212A INGUINAL STRAIN, LEFT, INITIAL ENCOUNTER: Primary | ICD-10-CM

## 2025-05-21 PROCEDURE — 99212 OFFICE O/P EST SF 10 MIN: CPT

## 2025-05-21 PROCEDURE — 99213 OFFICE O/P EST LOW 20 MIN: CPT

## 2025-05-21 NOTE — DISCHARGE INSTRUCTIONS
Crutches with no weightbearing, gradually advancing as tolerated  Rest  Elevate your injured extremity  Apply cool compresses for 20 minutes at a time.  Tylenol or motrin for pain  Do not drink alcohol in excess    Follow up with your doctor within a few days.  Call your doctor today for an appointment.  Consider follow-up with orthopedics or even physical therapy

## 2025-05-21 NOTE — ED PROVIDER NOTES
Patient Seen in: [facility]        History  Chief Complaint   Patient presents with    Fall     Stated Complaint: left inner thigh pain    Subjective:   HPI            Patient reports that last night he slipped on a wet driveway and \"did the splits \"patient complains of pain.  He points to the left groin and in the superior third of the left inner thigh as location of discomfort.  Hurts to move his leg.  Patient states that he drank a bottle of hard liquor last night trying to deal with his pain.  Despite this, he continues to have symptoms.  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  There is no abdominal pain.  No pain in the testicle itself.  No urinary symptoms such as burning or blood in the urine.  No numbness or weakness of the leg.      Objective:     No pertinent past medical history.            No pertinent past surgical history.              No pertinent social history.            Review of Systems    Positive for stated complaint: left inner thigh pain  Other systems are as noted in HPI.  Constitutional and vital signs reviewed.      All other systems reviewed and negative except as noted above.                  Physical Exam    ED Triage Vitals [05/21/25 0821]   /83   Pulse 100   Resp 19   Temp 97.6 °F (36.4 °C)   Temp src Oral   SpO2 97 %   O2 Device None (Room air)       Current Vitals:   Vital Signs  BP: 141/83  Pulse: 100  Resp: 19  Temp: 97.6 °F (36.4 °C)  Temp src: Oral    Oxygen Therapy  SpO2: 97 %  O2 Device: None (Room air)            Physical Exam  This an alert adult man.  His speech is slightly slurred and he smells of alcohol.  Abdomen is benign, soft, nondistended, completely nontender even with deep palpation  There is normal male external genitalia.  Testes are descended and freely mobile and completely nontender.  There is tenderness in the left groin that extends about 10 or 12 cm inferiorly over the medial  aspect of the thigh.  Internal rotation and abduction reproduced patient's discomfort.  The anterior thigh and lateral thigh are nontender.   Dorsalis pedis pulse are strong and symmetric  Strength in dorsi and plantarflexion of the foot are excellent  Sensation intact to light touch          ED Course  Labs Reviewed - No data to display                         MDM    Patient had a slipped causing him to \"do the splits \".  He has quite a bit of discomfort medially in the groin and medial superior thigh.  I suspect abductor strain.  Avulsion fracture also consideration    Also of concern is patient's inebriation.  Patient states that his mother dropped him off.  He is not driving.  I queried whether patient could benefit from alcohol treatment.  Patient declined any referrals.  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  I advised him that he may return during business hours for reassessment and x-rays.         Medical Decision Making      Disposition and Plan     Clinical Impression:  1. Inguinal strain, left, initial encounter         Disposition:  [place]  5/21/2025  9:04 am    Follow-up:  Jua nM Mosher DO  [address] [place] [address]  [address] [place] [id number] [phone]    Call today            Medications Prescribed:  Current Discharge Medication List                Supplementary Documentation:

## 2025-05-28 DIAGNOSIS — G47.00 INSOMNIA, UNSPECIFIED TYPE: ICD-10-CM

## 2025-05-29 ENCOUNTER — HOSPITAL ENCOUNTER (OUTPATIENT)
Dept: ULTRASOUND IMAGING | Age: 45
Discharge: HOME | End: 2025-05-29
Attending: FAMILY MEDICINE
Payer: MEDICAID

## 2025-05-29 DIAGNOSIS — R56.9 FIRST TIME SEIZURE (HCC): ICD-10-CM

## 2025-05-29 DIAGNOSIS — M54.50 CHRONIC MIDLINE LOW BACK PAIN WITHOUT SCIATICA: ICD-10-CM

## 2025-05-29 DIAGNOSIS — G47.50 PARASOMNIA: ICD-10-CM

## 2025-05-29 DIAGNOSIS — G89.29 CHRONIC MIDLINE LOW BACK PAIN WITHOUT SCIATICA: ICD-10-CM

## 2025-05-29 DIAGNOSIS — R74.8 ELEVATED LIVER ENZYMES: ICD-10-CM

## 2025-05-29 PROCEDURE — 76981 USE PARENCHYMA: CPT | Performed by: FAMILY MEDICINE

## 2025-05-29 PROCEDURE — 76705 ECHO EXAM OF ABDOMEN: CPT | Performed by: FAMILY MEDICINE

## 2025-05-29 RX ORDER — GABAPENTIN 300 MG/1
CAPSULE ORAL
Qty: 90 CAPSULE | Refills: 0 | Status: SHIPPED | OUTPATIENT
Start: 2025-05-29

## 2025-05-29 NOTE — TELEPHONE ENCOUNTER
A refill request was received for:  Requested Prescriptions     Pending Prescriptions Disp Refills    CLONAZEPAM 0.5 MG Oral Tab [Pharmacy Med Name: clonazePAM 0.5 MG Oral Tablet] 30 tablet 0     Sig: TAKE 1 TABLET BY MOUTH NIGHTLY AS NEEDED FOR SLEEP       Last refill date:   2-21-25    Last office visit: 2-21-25    Follow up due:  Future Appointments   Date Time Provider Department Center   6/3/2025  9:30 AM Juan M Mosher DO [facility]   6/5/2025 10:00 AM Justin Valadez MD [id number]

## 2025-05-31 RX ORDER — CLONAZEPAM 0.5 MG/1
0.5 TABLET ORAL NIGHTLY PRN
Qty: 30 TABLET | Refills: 0 | Status: SHIPPED | OUTPATIENT
Start: 2025-05-31

## 2025-06-05 ENCOUNTER — TELEPHONE (OUTPATIENT)
Facility: LOCATION | Age: 45
End: 2025-06-05

## 2025-06-05 NOTE — TELEPHONE ENCOUNTER
Rambo Angelica Izzysolo was scheduled for an appt on 6/5/2025 with a visit reason of Consult.    Appointment was a No Show.  Unable to reach patient.    Clinical Staff: Please notify provider if there were labs or x-rays being held for review at this missed visit.

## 2025-06-25 DIAGNOSIS — R56.9 FIRST TIME SEIZURE (HCC): ICD-10-CM

## 2025-06-25 DIAGNOSIS — M54.50 CHRONIC MIDLINE LOW BACK PAIN WITHOUT SCIATICA: ICD-10-CM

## 2025-06-25 DIAGNOSIS — G47.50 PARASOMNIA: ICD-10-CM

## 2025-06-25 DIAGNOSIS — E10.9 TYPE 1 DIABETES MELLITUS WITHOUT COMPLICATION (HCC): ICD-10-CM

## 2025-06-25 DIAGNOSIS — G89.29 CHRONIC MIDLINE LOW BACK PAIN WITHOUT SCIATICA: ICD-10-CM

## 2025-06-25 RX ORDER — ATORVASTATIN CALCIUM 20 MG/1
20 TABLET, FILM COATED ORAL NIGHTLY
Qty: 90 TABLET | Refills: 0 | Status: SHIPPED | OUTPATIENT
Start: 2025-06-25

## 2025-06-25 RX ORDER — LISINOPRIL 10 MG/1
10 TABLET ORAL DAILY
Qty: 90 TABLET | Refills: 0 | Status: SHIPPED | OUTPATIENT
Start: 2025-06-25

## 2025-06-25 RX ORDER — GABAPENTIN 300 MG/1
CAPSULE ORAL
Qty: 90 CAPSULE | Refills: 0 | Status: SHIPPED | OUTPATIENT
Start: 2025-06-25

## 2025-06-25 NOTE — TELEPHONE ENCOUNTER
A refill request was received for:  Requested Prescriptions     Pending Prescriptions Disp Refills    ATORVASTATIN 20 MG Oral Tab [Pharmacy Med Name: Atorvastatin Calcium 20 MG Oral Tablet] 90 tablet 0     Sig: Take 1 tablet by mouth nightly    GABAPENTIN 300 MG Oral Cap [Pharmacy Med Name: Gabapentin 300 MG Oral Capsule] 90 capsule 0     Sig: TAKE 1 CAPSULE BY MOUTH IN THE MORNING AND  1  CAPSULE  AT  NOON  AND  1  CAPSULE  AT  BEDTIME    LISINOPRIL 10 MG Oral Tab [Pharmacy Med Name: Lisinopril 10 MG Oral Tablet] 90 tablet 0     Sig: Take 1 tablet by mouth once daily       Last refill date:     Atorvastatin 4/1/25  Gabapentin 5/29/25  Lisinopril 2/21/25    Last office visit: 2/21/25    Follow up due:  Future Appointments   Date Time Provider Department Center   7/1/2025  1:30 PM Juan M Mosher DO [facility]

## 2025-06-27 ENCOUNTER — LAB ENCOUNTER (OUTPATIENT)
Dept: LAB | Age: 45
End: 2025-06-27
Attending: FAMILY MEDICINE
Payer: MEDICAID

## 2025-06-27 DIAGNOSIS — Z20.828 EXPOSURE TO HERPES SIMPLEX VIRUS (HSV): Primary | ICD-10-CM

## 2025-06-27 DIAGNOSIS — E10.9 TYPE 1 DIABETES MELLITUS WITHOUT COMPLICATION (HCC): ICD-10-CM

## 2025-06-27 LAB
ALBUMIN SERPL-MCNC: 4.3 G/DL (ref 3.2–4.8)
ALBUMIN/GLOB SERPL: 1.8 {RATIO} (ref 1–2)
ALP LIVER SERPL-CCNC: 68 U/L (ref 45–117)
ALT SERPL-CCNC: 155 U/L (ref 10–49)
ANION GAP SERPL CALC-SCNC: 9 MMOL/L (ref 0–18)
AST SERPL-CCNC: 80 U/L (ref ?–34)
BILIRUB SERPL-MCNC: 0.8 MG/DL (ref 0.3–1.2)
BUN BLD-MCNC: 10 MG/DL (ref 9–23)
CALCIUM BLD-MCNC: 9.2 MG/DL (ref 8.7–10.6)
CHLORIDE SERPL-SCNC: 100 MMOL/L (ref 98–112)
CO2 SERPL-SCNC: 29 MMOL/L (ref 21–32)
CREAT BLD-MCNC: 1.03 MG/DL (ref 0.7–1.3)
EGFRCR SERPLBLD CKD-EPI 2021: 91 ML/MIN/1.73M2 (ref 60–?)
FASTING STATUS PATIENT QL REPORTED: YES
GLOBULIN PLAS-MCNC: 2.4 G/DL (ref 2–3.5)
GLUCOSE BLD-MCNC: 180 MG/DL (ref 70–99)
OSMOLALITY SERPL CALC.SUM OF ELEC: 290 MOSM/KG (ref 275–295)
POTASSIUM SERPL-SCNC: 3.7 MMOL/L (ref 3.5–5.1)
PROT SERPL-MCNC: 6.7 G/DL (ref 5.7–8.2)
SODIUM SERPL-SCNC: 138 MMOL/L (ref 136–145)

## 2025-06-27 PROCEDURE — 80053 COMPREHEN METABOLIC PANEL: CPT

## 2025-06-27 PROCEDURE — 86696 HERPES SIMPLEX TYPE 2 TEST: CPT

## 2025-06-27 PROCEDURE — 36415 COLL VENOUS BLD VENIPUNCTURE: CPT

## 2025-06-27 PROCEDURE — 86695 HERPES SIMPLEX TYPE 1 TEST: CPT

## 2025-06-30 LAB
HSV 1 GLYCOPROTEIN G, IGG: POSITIVE
HSV 2 GLYCOPROTEIN G, IGG: NEGATIVE

## 2025-07-01 ENCOUNTER — OFFICE VISIT (OUTPATIENT)
Dept: FAMILY MEDICINE CLINIC | Facility: CLINIC | Age: 45
End: 2025-07-01
Payer: MEDICAID

## 2025-07-01 VITALS
OXYGEN SATURATION: 97 % | DIASTOLIC BLOOD PRESSURE: 86 MMHG | RESPIRATION RATE: 16 BRPM | HEART RATE: 64 BPM | WEIGHT: 202 LBS | SYSTOLIC BLOOD PRESSURE: 150 MMHG | BODY MASS INDEX: 23.85 KG/M2 | HEIGHT: 77 IN

## 2025-07-01 DIAGNOSIS — E11.69 HYPERLIPIDEMIA ASSOCIATED WITH TYPE 2 DIABETES MELLITUS (HCC): ICD-10-CM

## 2025-07-01 DIAGNOSIS — J45.20 MILD INTERMITTENT ASTHMA WITHOUT COMPLICATION (HCC): ICD-10-CM

## 2025-07-01 DIAGNOSIS — E78.5 HYPERLIPIDEMIA ASSOCIATED WITH TYPE 2 DIABETES MELLITUS (HCC): ICD-10-CM

## 2025-07-01 DIAGNOSIS — Z00.00 ANNUAL PHYSICAL EXAM: Primary | ICD-10-CM

## 2025-07-01 DIAGNOSIS — L03.031 PARONYCHIA, TOE, RIGHT: ICD-10-CM

## 2025-07-01 DIAGNOSIS — M54.50 CHRONIC MIDLINE LOW BACK PAIN WITHOUT SCIATICA: ICD-10-CM

## 2025-07-01 DIAGNOSIS — G89.29 CHRONIC MIDLINE LOW BACK PAIN WITHOUT SCIATICA: ICD-10-CM

## 2025-07-01 DIAGNOSIS — E10.9 TYPE 1 DIABETES MELLITUS WITHOUT COMPLICATION (HCC): ICD-10-CM

## 2025-07-01 DIAGNOSIS — G47.00 INSOMNIA, UNSPECIFIED TYPE: ICD-10-CM

## 2025-07-01 PROCEDURE — 99396 PREV VISIT EST AGE 40-64: CPT | Performed by: FAMILY MEDICINE

## 2025-07-01 PROCEDURE — 99213 OFFICE O/P EST LOW 20 MIN: CPT | Performed by: FAMILY MEDICINE

## 2025-07-01 RX ORDER — ALBUTEROL SULFATE 0.83 MG/ML
2.5 SOLUTION RESPIRATORY (INHALATION) EVERY 4 HOURS PRN
Qty: 1 EACH | Refills: 3 | Status: SHIPPED | OUTPATIENT
Start: 2025-07-01

## 2025-07-01 RX ORDER — CEPHALEXIN 500 MG/1
500 CAPSULE ORAL 3 TIMES DAILY
Qty: 30 CAPSULE | Refills: 0 | Status: SHIPPED | OUTPATIENT
Start: 2025-07-01

## 2025-07-01 RX ORDER — QUETIAPINE FUMARATE 50 MG/1
50 TABLET, FILM COATED ORAL NIGHTLY
Qty: 90 TABLET | Refills: 1 | Status: SHIPPED | OUTPATIENT
Start: 2025-07-01

## 2025-07-01 RX ORDER — ATORVASTATIN CALCIUM 20 MG/1
20 TABLET, FILM COATED ORAL NIGHTLY
Qty: 90 TABLET | Refills: 0 | Status: SHIPPED | OUTPATIENT
Start: 2025-07-01

## 2025-07-01 RX ORDER — TRAMADOL HYDROCHLORIDE 50 MG/1
50 TABLET ORAL EVERY 8 HOURS PRN
Qty: 9 TABLET | Refills: 0 | Status: SHIPPED | OUTPATIENT
Start: 2025-07-01

## 2025-07-01 RX ORDER — CLONAZEPAM 0.5 MG/1
0.5 TABLET ORAL NIGHTLY PRN
Qty: 30 TABLET | Refills: 0 | Status: SHIPPED | OUTPATIENT
Start: 2025-07-01

## 2025-07-01 RX ORDER — TADALAFIL 20 MG/1
TABLET ORAL
COMMUNITY
Start: 2025-03-31

## 2025-07-01 NOTE — H&P
The 21st Century Cures Act makes medical notes like these available to patients in the interest of transparency. Please be advised this is a medical document. Medical documents are intended to carry relevant information, facts as evident, and the clinical opinion of the practitioner. The medical note is intended as peer to peer communication and may appear blunt or direct. It is written in medical language and may contain abbreviations or verbiage that are unfamiliar.       Rambo Freitas is a 45 year old male who presents for a complete physical exam.   HPI:   Type I diabetes, Last A1c value was 8.7% done 9/29/2024. Highest blood sugar level goes 400 . Work out helps to reduce the blood sugar levels. Seeing Endocrinologist.    Insomnia, on Clonazepam 0.5 mg, not helping now.    Elevated blood pressure. No headache/dizziness, No CP/SOB.    Current Medications[1]   Past Medical History[2]   Past Surgical History[3]   Family History[4]   Social History:  Short Social Hx on File[5]        REVIEW OF SYSTEMS:   GENERAL: feels well otherwise  SKIN: denies any unusual skin lesions  EYES:denies blurred vision or double vision  HEENT: denies nasal congestion, sinus pain or ST, denies changes in hearing or vision  LUNGS: denies shortness of breath with exertion or frequent cough  CV: denies chest pain, pressure or palpitations  GI: denies abdominal pain,denies heartburn, denies chronic diarrhea or constipation  : denies nocturia or changes in stream, denies erectile dysfunction  MS: denies back pain, arthralgias or myalgias  NEURO: denies headaches or dizziness  PSYCH: denies depression or anxiety  HEMATOLOGIC: denies hx of anemia, easy bruising or bleeding  ENDOCRINE:denies frequent thirst or urination, denies unintentional weight gain/loss  ALL/ASTHMA: denies hx of allergy or asthma    EXAM:   /86   Pulse 64   Resp 16   Ht 6' 5\" (1.956 m)   Wt 202 lb (91.6 kg)   SpO2 97%   BMI 23.95 kg/m²   Body mass  index is 23.95 kg/m².     GENERAL:rt big toe nail infection, mild redness, no swelling, no pus.  SKIN: no rashes,no suspicious lesions  HEENT: atraumatic, normocephalic, PERRLA, conjunctiva clear, TMs clear, posterior pharynx clear, no congestion  NECK: supple,no adenopathy,no thyromegaly  LUNGS: clear to auscultation, easy breathing  CV: S1S2, RRR without murmur  GI: good BS's;no masses, HSM or tenderness  : two descended testes,no scrotal tenderness or mass, normal penis no lesions, no hernia  MS: Jose, no bony deformities, gait normal  EXT: no cyanosis, clubbing or edema  NEURO: Oriented times three, strength 5/5 x 4 ext, LE DTRs 2+  PSYCH: mood and affect appropriate    ASSESSMENT AND PLAN:   Rambo Freitas is a 45 year old male who presents for a complete physical exam. Recommended heart healthy diet, routine exercise, and annual flu vaccines.  Routine testicular exams reinforced. The patient indicates understanding of these issues and agrees to the plan.  Follow up in 1 month.    - No screen time before 1 hour prior to bed time, do deep breathing exercises for better sleep.  - Advice to measure BP at home regularly, gets high then message me.     1. Annual physical exam  - Lipid Panel [E]; Future  - TSH W Reflex To Free T4 [E]; Future  - Comp Metabolic Panel (14) [E]; Future    2. Type 1 diabetes mellitus without complication (HCC)  - atorvastatin 20 MG Oral Tab; Take 1 tablet (20 mg total) by mouth nightly.  Dispense: 90 tablet; Refill: 0  - Hemoglobin A1C; Future  - Comp Metabolic Panel (14) [E]; Future    3. Hyperlipidemia associated with type 2 diabetes mellitus (HCC)  - Microalb/Creat Ratio, Random Urine; Future  - Hemoglobin A1C; Future  - Lipid Panel [E]; Future  - Comp Metabolic Panel (14) [E]; Future    4. Mild intermittent asthma without complication (HCC)  - albuterol (2.5 MG/3ML) 0.083% Inhalation Nebu Soln; Take 3 mL (2.5 mg total) by nebulization every 4 (four) hours as needed for  Wheezing.  Dispense: 1 each; Refill: 3    5. Insomnia, unspecified type  - clonazePAM 0.5 MG Oral Tab; Take 1 tablet (0.5 mg total) by mouth nightly as needed for Anxiety.  Dispense: 30 tablet; Refill: 0    6. Paronychia, toe, right  - cephALEXin 500 MG Oral Cap; Take 1 capsule (500 mg total) by mouth 3 (three) times daily.  Dispense: 30 capsule; Refill: 0    Orders Placed This Encounter   Procedures    Microalb/Creat Ratio, Random Urine     Standing Status:   Future     Expected Date:   7/1/2025     Expiration Date:   7/1/2026     Release to patient:   Immediate    Hemoglobin A1C     Standing Status:   Future     Expected Date:   7/1/2025     Expiration Date:   7/1/2026     Release to patient:   Immediate    Lipid Panel [E]     Standing Status:   Future     Expected Date:   7/1/2025     Expiration Date:   7/1/2026     Release to patient:   Immediate    TSH W Reflex To Free T4 [E]     Standing Status:   Future     Expected Date:   7/1/2025     Expiration Date:   7/1/2026     Release to patient:   Immediate    Comp Metabolic Panel (14) [E]     Standing Status:   Future     Expected Date:   7/1/2025     Expiration Date:   7/1/2026     Release to patient:   Immediate      Note written by martine.  Scribe is in the room with me.  Scribe has written note according to my dictation.  Reviewed scribe note.  Changed as appropriate.         [1]   Current Outpatient Medications   Medication Sig Dispense Refill    albuterol 108 (90 Base) MCG/ACT Inhalation Aero Soln Inhale 2 puffs into the lungs every 6 (six) hours as needed for Wheezing. 3 each 3    Tadalafil 20 MG Oral Tab Take 1 tablet by mouth as needed for erectile dysfunction (no more than 1 tablet in a 24 hr period)      albuterol (2.5 MG/3ML) 0.083% Inhalation Nebu Soln Take 3 mL (2.5 mg total) by nebulization every 4 (four) hours as needed for Wheezing. 1 each 3    clonazePAM 0.5 MG Oral Tab Take 1 tablet (0.5 mg total) by mouth nightly as needed for Anxiety. 30 tablet  0    atorvastatin 20 MG Oral Tab Take 1 tablet (20 mg total) by mouth nightly. 90 tablet 0    QUEtiapine (SEROQUEL) 50 MG Oral Tab Take 1 tablet (50 mg total) by mouth nightly. 90 tablet 1    cephALEXin 500 MG Oral Cap Take 1 capsule (500 mg total) by mouth 3 (three) times daily. 30 capsule 0    GABAPENTIN 300 MG Oral Cap TAKE 1 CAPSULE BY MOUTH IN THE MORNING AND  1  CAPSULE  AT  NOON  AND  1  CAPSULE  AT  BEDTIME 90 capsule 0    LISINOPRIL 10 MG Oral Tab Take 1 tablet by mouth once daily 90 tablet 0    CLONAZEPAM 0.5 MG Oral Tab TAKE 1 TABLET BY MOUTH NIGHTLY AS NEEDED FOR SLEEP 30 tablet 0    insulin glargine (LANTUS SOLOSTAR) 100 UNIT/ML Subcutaneous Solution Pen-injector Inject 24 Units into the skin nightly. Administer 24 units under the skin every night for pump failure. 30 mL 1    Budesonide-Formoterol Fumarate (SYMBICORT) 160-4.5 MCG/ACT Inhalation Aerosol Inhale 2 puffs into the lungs 2 (two) times daily. 10.2 g 5    ergocalciferol 1.25 MG (71426 UT) Oral Cap Take 1 capsule (50,000 Units total) by mouth once a week. Once weekly x 12 weeks. 12 capsule 0    Insulin Pen Needle (TRUEPLUS PEN NEEDLES) 31G X 5 MM Does not apply Misc Use to inject insulin 4 times daily. 90 day supply. 400 each 1    Continuous Glucose Transmitter (DEXCOM G6 TRANSMITTER) Does not apply Misc Change every 90 days 1 each 2    Continuous Glucose Sensor (DEXCOM G6 SENSOR) Does not apply Misc 1 each Every 10 days. Use as directed every 10 days 9 each 2    Insulin Disposable Pump (OMNIPOD 5 RIVV0A5 INTRO GEN 5) Does not apply Kit 1 each continuous prn. 1 kit 0    Insulin Lispro, 1 Unit Dial, (HUMALOG KWIKPEN) 100 UNIT/ML Subcutaneous Solution Pen-injector Test blood glucose 3 times daily before meals. Inject 4 units prior to meals and 1 unit of insulin for every 30 points of glucose is greater than 130mg/dL. MDD of 30 units 5 each 0    aspirin 81 MG Oral Tab EC aspirin 81 mg tablet,delayed release, [RxNorm: 014574]      pantoprazole 20 MG  Oral Tab EC Take 1 tablet (20 mg total) by mouth 2 (two) times daily before meals. 180 tablet 1    Blood Glucose Monitoring Suppl (ONETOUCH VERIO FLEX SYSTEM) w/Device Does not apply Kit Test blood sugar 3 times per day. 1 kit 0    Glucose Blood (ONETOUCH VERIO) In Vitro Strip Test blood sugar 3 times per day. 100 strip 6    OneTouch Delica Lancets 33G Does not apply Misc Test blood sugar 3 times per day. 100 each 6    TRAMADOL 50 MG Oral Tab TAKE 1 TABLET BY MOUTH EVERY 8 HOURS AS NEEDED FOR PAIN 9 tablet 0   [2]   Past Medical History:   Asthma (HCC)    Back pain    2 bulging disks    Back problem    Degenerative disc disease in lower back    Bilateral hearing loss, unspecified hearing loss type    Exercise-induced asthma (HCC)    Mitral valve prolapse    has been checked for heart problems and arrythmia at [facility] (about )    Rib fracture    left side    Sleep walking    Type 1 diabetes mellitus (HCC)    Visual impairment    Glasses   [3]   Past Surgical History:  Procedure Laterality Date    Colonoscopy      Colonoscopy N/A 2024    Procedure: COLONOSCOPY;  Surgeon: Manuel Obrien MD;  Location:  ENDOSCOPY    Tonsillectomy  age 17   [4]   Family History  Problem Relation Age of Onset    Breast Cancer Mother     Heart Disease Father         pacemaker    Heart Disease Sister         pacemaker     Heart Disease Paternal Aunt         pacemaker    Heart Disease Paternal Aunt         pacemaker   [5]   Social History  Socioeconomic History    Marital status:    Tobacco Use    Smoking status: Former     Current packs/day: 1.00     Average packs/day: 1 pack/day for 10.0 years (10.0 ttl pk-yrs)     Types: Cigarettes    Smokeless tobacco: Never   Vaping Use    Vaping status: Some Days    Substances: Nicotine   Substance and Sexual Activity    Alcohol use: Not Currently     Alcohol/week: 3.0 standard drinks of alcohol     Types: 3 Standard drinks or equivalent per week     Comment: Resumed  drinking after getting out of assisted. Drank for 5 days. 750mL of vodka per day    Drug use: No   Social History Narrative    Has six children        Construction work for years     Social Drivers of Health     Food Insecurity: No Food Insecurity (9/29/2024)    Food Insecurity     Food Insecurity: Never true   Transportation Needs: No Transportation Needs (9/29/2024)    Transportation Needs     Lack of Transportation: No   Housing Stability: Low Risk  (9/29/2024)    Housing Stability     Housing Instability: No

## 2025-07-01 NOTE — TELEPHONE ENCOUNTER
A refill request was received for:  Requested Prescriptions     Pending Prescriptions Disp Refills    TRAMADOL 50 MG Oral Tab [Pharmacy Med Name: traMADol HCl 50 MG Oral Tablet] 9 tablet 0     Sig: TAKE 1 TABLET BY MOUTH EVERY 8 HOURS AS NEEDED FOR PAIN       Last refill date:   10/25/24    Last office visit: 2/21/25    Follow up due:  Future Appointments   Date Time Provider Department Center   7/1/2025  1:30 PM Juan M Mosher,  [facility]

## 2025-07-02 RX ORDER — ALBUTEROL SULFATE 90 UG/1
2 INHALANT RESPIRATORY (INHALATION) EVERY 6 HOURS PRN
Qty: 3 EACH | Refills: 3 | Status: SHIPPED | OUTPATIENT
Start: 2025-07-02

## 2025-07-19 ENCOUNTER — MOBILE ENCOUNTER (OUTPATIENT)
Facility: CLINIC | Age: 45
End: 2025-07-19

## 2025-07-19 NOTE — PROGRESS NOTES
Page received from patient, currently admitted to ICU at [facility] with hyperglycemia and being treated with IV insulin. Reports he lost his G6 transmitter and was denied a replacement so attempted to manage Omnipod pump manually, however was not successful and ended up presenting to ED and getting admitted. Thinks he was seen by endo at some point in the last few days. States “the doctor wants me to go back onto my pump but I need a transmitter”. Asking if I have an extra transmitter that can be delivered to [facility] or picked up; advised patient I am unable to access any office samples on the weekend as the office is closed and I do not have privileges at [facility] to manage his transition back to pump. Advised that he have [facility] team contact endocrinologist on call for [place] for guidance regarding transition to pump or to subq if no available transmitters. During discussion patient’s mother sent him a photo of a transmitter found at home, pt will check if this is his missing transmitter and if so mother will deliver it to hospital so he can resume pump. If this is an old transmitter, will discuss recommendations with inpatient endocrinology team/ICU at [facility] for acute care. Pt verbalized understanding and will page back with any questions.     Aide O[place]iedo, DO [facility]

## 2025-07-24 ENCOUNTER — NURSE ONLY (OUTPATIENT)
Facility: CLINIC | Age: 45
End: 2025-07-24
Payer: MEDICAID

## 2025-07-24 PROCEDURE — 99211 OFF/OP EST MAY X REQ PHY/QHP: CPT | Performed by: STUDENT IN AN ORGANIZED HEALTH CARE EDUCATION/TRAINING PROGRAM

## 2025-07-24 NOTE — PATIENT INSTRUCTIONS
Changes we discussed today:  We will start the order for Tandem Mobi  It may take up to 2 weeks to get the order processed and the pump supplies get shipped to you   Expect a call from [employer] to confirm the cost/pricing of pump & supply order with you  Switch over to Dexcom G7 sensors   Will have Dr. Johnson send in new prescription for G7 to your [facility]      Next Steps:  Schedule follow up appointment with Diabetes Educator when you receive pump supplies for training.    _______________________________________________      Blood Glucose Goals:  Between  mg/dl in the morning  Less than 180 mg/dl 2 hours after meals.  (You and your doctor might have discussed more specific goals)      What to do if your blood sugar is LOW (below 70 mg/dL)           What to do if your blood sugar is HIGH (above 300 mg/dL)          Nori Gallagher, RD, LDN   Diabetes Educator  [facility]  [phone] (office)  Direct ext. [phone]  Office Phone Hours  8:30 am - 4:30 pm, M-F

## 2025-07-24 NOTE — PROGRESS NOTES
Rambo Freitas 3/19/1980 was seen for Individual Pump Start Education:  Date: 7/24/2025 Referring Provider: Rianna Johnson DO     Start time: 3:30pm End time: 4:40pm      Visit Summary:   Reviewed Tandem pumps and iLet Beta Bionics pump with pt.   Reviewed the Control-IQ+ algorithm with Tandem Mobi and T:slim X2.      Blood glucose monitoring: CGM: Dexcom G6      Pump Review:  Discussed importance of entering grams of carbs into insulin pump.  Reviewed different insulin pump options (with tubing, tubeless, uses automated insulin delivery/integrated with CGM).  Discussed how insulin is filled in reservoir/POD with demo samples and is filled every 2-3 days.         Carb Counting  Importance of accurately carb counting and, if applicable, taking insulin before eating  and  Pump: Entering carbs correctly into pump for accurate dosing           Patient Goals:  2. Practice using Tandem simulator (t:simulator).  5. Follow up to schedule pump start appointment once supplies have been received.  Download Tandem Mobi sarwat onto phone      Comments:   Currently uses Omnipod 5 with Dexcom G6 + Humalog U-100  Also taking 20-25 units Lantus daily in addition to pump          Patient verbalized understanding and has no further questions at this time.      Patient will set follow up appointment with Diabetes Educator once he receives Tandem Mobi supplies for pump training.       Nori Gallagher RD, LDN  Diabetes Educator  [facility]

## 2025-07-25 DIAGNOSIS — M54.50 CHRONIC MIDLINE LOW BACK PAIN WITHOUT SCIATICA: ICD-10-CM

## 2025-07-25 DIAGNOSIS — G89.29 CHRONIC MIDLINE LOW BACK PAIN WITHOUT SCIATICA: ICD-10-CM

## 2025-07-25 DIAGNOSIS — R56.9 FIRST TIME SEIZURE (HCC): ICD-10-CM

## 2025-07-25 DIAGNOSIS — G47.50 PARASOMNIA: ICD-10-CM

## 2025-07-25 RX ORDER — GABAPENTIN 300 MG/1
300 CAPSULE ORAL 3 TIMES DAILY
Qty: 90 CAPSULE | Refills: 0 | Status: SHIPPED | OUTPATIENT
Start: 2025-07-25

## 2025-07-31 ENCOUNTER — TELEPHONE (OUTPATIENT)
Facility: CLINIC | Age: 45
End: 2025-07-31

## (undated) DEVICE — LASSO POLYPECTOMY SNARE: Brand: LASSO

## (undated) DEVICE — 3M™ RED DOT™ MONITORING ELECTRODE WITH FOAM TAPE AND STICKY GEL, 50/BAG, 20/CASE, 72/PLT 2570: Brand: RED DOT™

## (undated) DEVICE — GIJAW SINGLE-USE BIOPSY FORCEPS WITH NEEDLE: Brand: GIJAW

## (undated) DEVICE — 1200CC GUARDIAN II: Brand: GUARDIAN

## (undated) DEVICE — KIT VLV 5 PC AIR H2O SUCT BX ENDOGATOR CONN

## (undated) DEVICE — 10FT COMBINED O2 DELIVERY/CO2 MONITORING. FILTER WITH MICROSTREAM TYPE LUER: Brand: DUAL ADULT NASAL CANNULA

## (undated) DEVICE — V2 SPECIMEN COLLECTION MANIFOLD KIT: Brand: NEPTUNE

## (undated) DEVICE — BITEBLOCK ENDOSCP 60FR MAXI STRP

## (undated) DEVICE — KIT CUSTOM ENDOPROCEDURE STERIS

## (undated) NOTE — Clinical Note
Hi,  I spoke to you today about this pt. He wants to switch from OP5 with Dexcom G6 to Tandem Mobi pump with Dexcom G7. I will meet with him for pump start/training when he receives supplies. He needs new rx for G7. Can you please send Rx for Dexcom G7 sensors sent to [facility] ([facility] #[id number]?  Also needs to start the Tandem Mobi order thru [facility]. For ordering, I recommend the Huaat XC infusion sets- 23 in tubing, 6 mm cannula.  Thank you so much!

## (undated) NOTE — ED AVS SNAPSHOT
Swati Kidney   MRN: [id number]    Department:  [facility] Emergency Department   Date of Visit:  2/22/2019           Disclosure     Insurance plans vary and the physician(s) referred by the ER may not be covered by your plan.  Please contact yo tell this physician (or your personal doctor if your instructions are to return to your personal doctor) about any new or lasting problems. The primary care or specialist physician will see patients referred from the [facility] Emergency Department.  Cristopher Lozada

## (undated) NOTE — ED AVS SNAPSHOT
Deuce De Paz [facility] AT [facility]   MRN: [id number]    Department:  [facility] Emergency Department   Date of Visit:  8/19/2017           Disclosure     Insurance plans vary and the physician(s) referred by the ER may not be covered by your plan.  Please con If you have been prescribed any medication(s), please fill your prescription right away and begin taking the medication(s) as directed    If the emergency physician has read X-rays, these will be re-interpreted by a radiologist.  If there is a significant

## (undated) NOTE — ED AVS SNAPSHOT
[facility] Emergency Department  [place] [address]  Phone:  [phone]  Fax:  [phone]          Anthony Erwinas   MRN: [id number]    Department:  [facility] Emergency Department   Date of Visit:  7/18/2 IF THERE IS ANY CHANGE OR WORSENING OF YOUR CONDITION, CALL YOUR PRIMARY CARE PHYSICIAN AT ONCE OR RETURN IMMEDIATELY TO THE EMERGENCY DEPARTMENT.     If you have been prescribed any medication(s), please fill your prescription right away and begin taking t

## (undated) NOTE — ED AVS SNAPSHOT
[facility] Emergency Department    [place]  [address] [address]    Phone:  [phone]    Fax:  [phone]           Brendan Clements   MRN: [id number]    Department:  [facility] Emergency Department   Date of Visi IF THERE IS ANY CHANGE OR WORSENING OF YOUR CONDITION, CALL YOUR PRIMARY CARE PHYSICIAN AT ONCE OR RETURN IMMEDIATELY TO THE EMERGENCY DEPARTMENT.     If you have been prescribed any medication(s), please fill your prescription right away and begin taking t

## (undated) NOTE — ED AVS SNAPSHOT
[facility] Emergency Department    [place] [address]    Phone:  [phone]    Fax:  [phone]           Miguel Sainz   MRN: [id number]    Department:  [facility] Emergency Department   Date of Visi doctor. Please ask your health care provider, pharmacist or nurse if you have any questions regarding your home medications, including potential side effects.               Medication List      START taking these medications     CloNIDine HCl 0.1 MG Tabs If you have any problems with your follow-up, please call our  at [phone]    Si usted tiene algun problema con schumacher sequimiento, por favor llame a nuestro adminstrador de casos al [phone]    Expect to receive an electronic request Allison Khan [address] ([address]) Christine ([address] [place] [address] [facility]9   [facility] [address] ([address] [facility]) [address]  (Do

## (undated) NOTE — LETTER
[address]  [address]  Authorization for Surgical Operation and Procedure   Date:___________                                                                                                         Time:__________  I hereby authorizeManuel Obrien MD, my physician and his/her assistants (if applicable), which may include medical students, residents, and/or fellows, to perform the following surgical operation/ procedure and administer such anesthesia as may be determined necessary by my physician:  Operation/Procedure name (s) Procedure(s):  ESOPHAGOGASTRODUODENOSCOPY (EGD) on Rambo Freitas   2.   I recognize that during the surgical operation/procedure, unforeseen conditions may necessitate additional or different procedures than those listed above.  I, therefore, further authorize and request that the above-named surgeon, assistants, or designees perform such procedures as are, in their judgment, necessary and desirable.    3.   My surgeon/physician has discussed prior to my surgery the potential benefits, risks and side effects of this procedure; the likelihood of achieving goals; and potential problems that might occur during recuperation.  They also discussed reasonable alternatives to the procedure, including risks, benefits, and side effects related to the alternatives and risks related to not receiving this procedure.  I have had all my questions answered and I acknowledge that no guarantee has been made as to the result that may be obtained.    4.   Should the need arise during my operation or immediate post-operative period, I also consent to the administration of blood and/or blood products.  Further, I understand that despite careful testing and screening of blood or blood products by collecting agencies, I may still be subject to ill effects as  a result of receiving a blood transfusion and/or blood products.  The following are some, but not all, of the potential risks that can occur: fever and allergic reactions, hemolytic reactions, transmission of diseases such as Hepatitis, AIDS and Cytomegalovirus (CMV) and fluid overload.  In the event that I wish to have an autologous transfusion of my own blood, or a directed donor transfusion.  I will discuss this with my physician.   5.   I authorize the use of any specimen, organs, tissues, body parts or foreign objects that may be removed from my body during the operation/procedure for diagnosis, research or teaching purposes and their subsequent disposal by hospital authorities.  I also authorize the release of specimen test results and/or written reports to my treating physician on the hospital medical staff or other referring or consulting physicians involved in my care, at the discretion of the Pathologist or my treating physician.    6.   I consent to the photographing or videotaping of the operations or procedures to be performed, including appropriate portions of my body for medical, scientific, or educational purposes, provided my identity is not revealed by the pictures or by descriptive texts accompanying them.  If the procedure has been photographed/videotaped, the surgeon will obtain the original picture, image, videotape or CD.  The hospital will not be responsible for storage, release or maintenance of the picture, image, tape or CD.    7.   I consent to the presence of a  or observers in the operating room as deemed necessary by my physician or their designees.    8.   I recognize that in the event my procedure results in extended X-Ray/fluoroscopy time, I may develop a skin reaction.    9. If I have a Do Not Attempt Resuscitation (DNAR) order in place, that status will be suspended while in the operating room, procedural suite, and during the recovery period unless otherwise  explicitly stated by me (or a person authorized to consent on my behalf). The surgeon or my attending physician will determine when the applicable recovery period ends for purposes of reinstating the DNAR order.  10. Patients having a sterilization procedure: I understand that if the procedure is successful the results will be permanent and it will therefore be impossible for me to inseminate, conceive, or bear children.  I also understand that the procedure is intended to result in sterility, although the result has not been guaranteed.   11. I acknowledge that my physician has explained sedation/analgesia administration to me including the risk and benefits I consent to the administration of sedation/analgesia as may be necessary or desirable in the judgment of my physician.    I CERTIFY THAT I HAVE READ AND FULLY UNDERSTAND THE ABOVE CONSENT TO OPERATION and/or OTHER PROCEDURE.      _________________________________________  __________________________________  Signature of Patient     Signature of Responsible Person         ___________________________________         Printed Name of Responsible Person           _________________________________                 Relationship to Patient  _________________________________________  ______________________________  Signature of Witness          Date  Time    Patient Name: Rambo ORTIZ Fortino     : 3/19/1980                 Printed: 2024     Medical Record #: [id number]                     Page 1 of 1